# Patient Record
Sex: MALE | Race: WHITE | NOT HISPANIC OR LATINO | Employment: OTHER | URBAN - METROPOLITAN AREA
[De-identification: names, ages, dates, MRNs, and addresses within clinical notes are randomized per-mention and may not be internally consistent; named-entity substitution may affect disease eponyms.]

---

## 2017-03-09 ENCOUNTER — TELEPHONE (OUTPATIENT)
Dept: ENDOCRINOLOGY | Facility: CLINIC | Age: 32
End: 2017-03-09

## 2017-03-09 DIAGNOSIS — R73.01 IMPAIRED FASTING GLUCOSE: ICD-10-CM

## 2017-03-09 DIAGNOSIS — C73 THYROID CANCER: Primary | ICD-10-CM

## 2017-03-09 NOTE — TELEPHONE ENCOUNTER
----- Message from Jerrica Gonzalez sent at 3/9/2017 10:59 AM CST -----  Contact: Teresita / duyen     [phone]  Says she needs to urgently get the faxed orders sent to : [phone] attn: Isabela so pt. Can have the labs done asap.    Says she called earlier today ref. This.     Pls call.

## 2017-08-08 ENCOUNTER — TELEPHONE (OUTPATIENT)
Dept: ENDOCRINOLOGY | Facility: CLINIC | Age: 32
End: 2017-08-08

## 2017-08-08 DIAGNOSIS — E11.9 TYPE 2 DIABETES MELLITUS WITHOUT COMPLICATION, WITHOUT LONG-TERM CURRENT USE OF INSULIN: ICD-10-CM

## 2017-08-08 DIAGNOSIS — C73 THYROID CANCER: Primary | ICD-10-CM

## 2017-08-08 DIAGNOSIS — R73.01 IFG (IMPAIRED FASTING GLUCOSE): ICD-10-CM

## 2017-08-08 NOTE — TELEPHONE ENCOUNTER
----- Message from Jerrica Gonzalez sent at 8/8/2017  3:32 PM CDT -----  Contact: Teresita / Marco of pt.    [phone]  Caller says pt. Was supposed to have had  labs by now.   Have not received any appts. For this yet.    Also needs to f/u w/ you.  ( no access )  Pls call.

## 2017-09-14 RX ORDER — LEVOTHYROXINE SODIUM 200 UG/1
TABLET ORAL
Qty: 30 TABLET | Refills: 3 | Status: SHIPPED | OUTPATIENT
Start: 2017-09-14 | End: 2017-12-11 | Stop reason: SDUPTHER

## 2017-12-11 ENCOUNTER — OFFICE VISIT (OUTPATIENT)
Dept: ENDOCRINOLOGY | Facility: CLINIC | Age: 32
End: 2017-12-11
Payer: MEDICARE

## 2017-12-11 VITALS
HEIGHT: 63 IN | SYSTOLIC BLOOD PRESSURE: 104 MMHG | BODY MASS INDEX: 50.59 KG/M2 | WEIGHT: 285.5 LBS | DIASTOLIC BLOOD PRESSURE: 64 MMHG | HEART RATE: 68 BPM

## 2017-12-11 DIAGNOSIS — E89.0 POST-SURGICAL HYPOTHYROIDISM: Primary | ICD-10-CM

## 2017-12-11 DIAGNOSIS — C73 THYROID CANCER: ICD-10-CM

## 2017-12-11 DIAGNOSIS — R73.01 IFG (IMPAIRED FASTING GLUCOSE): ICD-10-CM

## 2017-12-11 PROCEDURE — 99214 OFFICE O/P EST MOD 30 MIN: CPT | Mod: S$PBB,,, | Performed by: INTERNAL MEDICINE

## 2017-12-11 PROCEDURE — 99213 OFFICE O/P EST LOW 20 MIN: CPT | Mod: PBBFAC | Performed by: INTERNAL MEDICINE

## 2017-12-11 PROCEDURE — 99999 PR PBB SHADOW E&M-EST. PATIENT-LVL III: CPT | Mod: PBBFAC,,, | Performed by: INTERNAL MEDICINE

## 2017-12-11 RX ORDER — DEXTROSE 4 G
TABLET,CHEWABLE ORAL
COMMUNITY
Start: 2016-04-15

## 2017-12-11 RX ORDER — AZITHROMYCIN 250 MG/1
TABLET, FILM COATED ORAL
Qty: 3 TABLET | Refills: 1 | Status: SHIPPED | OUTPATIENT
Start: 2017-12-11 | End: 2017-12-16

## 2017-12-11 RX ORDER — LEVOTHYROXINE SODIUM 200 UG/1
200 TABLET ORAL
Qty: 30 TABLET | Refills: 12 | Status: SHIPPED | OUTPATIENT
Start: 2017-12-11 | End: 2021-03-18

## 2017-12-11 RX ORDER — LANCETS
EACH MISCELLANEOUS
COMMUNITY
Start: 2016-04-15

## 2017-12-11 NOTE — PROGRESS NOTES
Subjective:       Patient ID: Vikram Pollock is a 32 y.o. male.    Chief Complaint: Hypothyroidism    HPI   The patient comes in with his mother.   PMH of stage 1 papillary thyroid cancer dx in 2011 s/p thyroidectomy and CALIX. This was done at [facility] in [place], [place]. The histopathology and the dose  of the radioactive iodine are not known. Pt was hospitalized last month for 2 days for ?thyroitoxicosis symptoms, and dose of LT4 decreased from 200-->150mcg, pt matilde well, no tremors, no palps.   Pt also has morbid obesity, IFG, HTN.His levothyroxine was increased to 200 fang. He has been on topamax and has only lost 5 lbs (was 278 in 6/2014, today is 272). Pt and mother admit that he has not been physically active with exercise. He reports fatigue. Pt had hypertensive reaction to Adipex and has discontinued it. He is seeing cardiologist on [place]. Pt also has intermittent diarrhea since 2011, for which he was evaluated by GI.  He seems to have been mentally challenged, but offers   some history, and has no particular complaints. Doing well. Heart rate controlled with new calcium channel blocker.  Pt has cyst anterior neck that is painful.  Has URI but overall doing well.  Review of Systems   Constitutional: Positive for fatigue. Negative for activity change, appetite change and unexpected weight change.   HENT:        Neck discomfort anterior neck x 2-3 weeks.   Eyes: Negative for visual disturbance.   Respiratory: Negative for cough and shortness of breath.    Cardiovascular: Negative for chest pain, palpitations and leg swelling.   Gastrointestinal: Positive for diarrhea. Negative for abdominal distention, abdominal pain, blood in stool, constipation, nausea and vomiting.   Endocrine: Negative for cold intolerance and heat intolerance.     Night sweats, leg cramps night  Di6uulydis:      Physical Exam    GENERAL: The patient is alert and cooperative. He is in no acute distress.  VITAL  SIGNS: Blood pressure 140/88, pulse 88, weight is 281.4, and BMI is 50.62.  EYES: No eye findings of Graves disease. No scleral icterus.  ENT: No lesion on tongue, hard palate, soft palate or posterior pharynx.  NECK: No thyromegaly. No neck vein distention. Trachea midline.  LYMPHATICS: No anterior or posterior cervical adenopathy. No supraclavicular   adenopathy.  HEART: Normal sinus rhythm. No murmurs. No rubs. No carotid bruits.  LUNGS: Clear. Percussion normal. No respiratory difficulty.  ABDOMEN: No masses, tenderness or organomegaly.  EXTREMITIES: No clubbing, cyanosis, edema or tremor.  SKIN: The patient does have prominent supraclavicular fat pad. He has   plethoric appearance, and he has truncal obesity.  Assessment:       1. Thyroid cancer    2. Post-surgical hypothyroidism    3. Hypertension    4. Morbid obesity with BMI of 50.0-59.9, adult    5. IFG (impaired fasting glucose)    6. Elevated LFTs  prob fatty liver               Plan:       Vikram was seen today for hypothyroidism and thyroid cancer.    Diagnoses and associated orders for this visit:    Hx of Stage 1 papillary thyroid cancer in 2011, s/p thyroidectomy and CALIX  Post-surgical hypothyroidism  TSH = 0.04, cont LT4 150mcg daily. Due to tachycardia, will not be overly aggressive. Current TSH acceptable  - TSH;   - Thyroglobulin; Future    Hypertension, controlled  - cont BB  -lisinopril (PRINIVIL,ZESTRIL) 5 MG tablet; Take 1 tablet (5 mg total) by mouth every evening.  - STOP HCTZ. Prefer to have pt on ACEi given IFG    Morbid obesity with BMI of 50.0-59.9, adult  Cont topamax. Adverse reaction to adipex. No stimulants in this pt due to tachycardia     IFG (impaired fasting glucose)  A1c 6.1%, increased from 5.8, increase metformin 500-->1000mg daily   BP at goal, start ACEi today (stop HCTZ)  - Lipid panel; Future  - Microalbumin/creatinine urine ratio; Future  - lisinopril (PRINIVIL,ZESTRIL) 5 MG tablet; Take 1 tablet (5 mg total) by  mouth every evening.  - Hemoglobin A1c; Future  - CBC auto differential; Future  - Comprehensive metabolic panel; Future  - metformin (GLUCOPHAGE) 1000 MG tablet; Take 1 tablet (1,000 mg total) by mouth daily with breakfast.  Rajor burn  Topical steroids  Zithromax for 3 days  No shaving for 1 week  See derm if no better.  Check labs today.  Lower dose by taking 6/7 days a week.

## 2018-03-06 ENCOUNTER — TELEPHONE (OUTPATIENT)
Dept: ENDOCRINOLOGY | Facility: CLINIC | Age: 33
End: 2018-03-06

## 2018-03-06 NOTE — TELEPHONE ENCOUNTER
----- Message from Lanny Smith sent at 3/6/2018  8:20 AM CST -----  Contact: Teresita / Mother [phone]  Teresita is requesting a call from Dr. Pang or Letty to discuss a personal matter. She states you are aware of it.

## 2018-07-12 ENCOUNTER — TELEPHONE (OUTPATIENT)
Dept: ENDOCRINOLOGY | Facility: CLINIC | Age: 33
End: 2018-07-12

## 2018-07-12 NOTE — TELEPHONE ENCOUNTER
----- Message from Bee Hardin sent at 7/12/2018  9:29 AM CDT -----  .Needs Advice    Reason for call:    Pt cyst has return, aggressive in his throat  Communication Preference: [phone]  Additional Information: Was told by doctor to have surgery right away. Try to get surgery set up w/ Dr Panda Quijano but is not able to get an appt until Sept.  Is asking if Dr Pang can see him are assist him to get surgery sooner. Please call

## 2018-07-12 NOTE — TELEPHONE ENCOUNTER
Called and spoke with patient's mother.  Explains that, she was able to get in touch with Dr. Quijano and has a scheduled appointment already.

## 2018-07-17 ENCOUNTER — TELEPHONE (OUTPATIENT)
Dept: OTOLARYNGOLOGY | Facility: CLINIC | Age: 33
End: 2018-07-17

## 2018-07-17 NOTE — TELEPHONE ENCOUNTER
----- Message from Isabell Choudhary sent at 7/17/2018  1:17 PM CDT -----  Contact: patient mother  Please call above patient mother need to speak with the nurse called early today has not received a call back

## 2018-07-17 NOTE — TELEPHONE ENCOUNTER
----- Message from Kristy Cheng NP sent at 7/17/2018  8:37 AM CDT -----  Just talked to Dr. Quijano about him... He definitely can't see him on the 26th.    Yaz

## 2018-07-24 ENCOUNTER — OFFICE VISIT (OUTPATIENT)
Dept: OTOLARYNGOLOGY | Facility: CLINIC | Age: 33
End: 2018-07-24
Payer: MEDICARE

## 2018-07-24 VITALS
DIASTOLIC BLOOD PRESSURE: 81 MMHG | HEART RATE: 85 BPM | TEMPERATURE: 98 F | BODY MASS INDEX: 51.47 KG/M2 | WEIGHT: 290.56 LBS | SYSTOLIC BLOOD PRESSURE: 126 MMHG

## 2018-07-24 DIAGNOSIS — L72.0 EPIDERMAL INCLUSION CYST: ICD-10-CM

## 2018-07-24 DIAGNOSIS — L73.8 FOLLICULITIS BARBAE: Primary | ICD-10-CM

## 2018-07-24 PROCEDURE — 99213 OFFICE O/P EST LOW 20 MIN: CPT | Mod: PBBFAC | Performed by: OTOLARYNGOLOGY

## 2018-07-24 PROCEDURE — 99999 PR PBB SHADOW E&M-EST. PATIENT-LVL III: CPT | Mod: PBBFAC,,, | Performed by: OTOLARYNGOLOGY

## 2018-07-24 PROCEDURE — 99213 OFFICE O/P EST LOW 20 MIN: CPT | Mod: S$PBB,,, | Performed by: OTOLARYNGOLOGY

## 2018-07-24 NOTE — PROGRESS NOTES
[facility] NOTE    CC: F/U sebaceous cyst    TREATMENT HISTORY:  1. Excision sebaceous cyst, 2016    INTERVAL HISTORY:Vikram Pollock returns to the [facility] for follow-up of sebaceous cyst. No complaints today.Denies dysphagia, odynophagia, throat pain and otalgia.Voice is stable. Does not experience dry mouth. Denies fevers, chills, and nightsweats. There has not been any redness or drainage from the wound. Had a recent episode of what appears to be folliculitis treated successfully with po abx. Still with some tenderness to palpation in the submental area.    Exam:  Acne in beard distribution  Thyroidectomy scar well-healed  No appreciable masses or lumps  Area of concern corresponds to normal hyoid bone    A/P: reassurance provided. His folliculitis appears to have resolved, and his exam is normal today.

## 2018-10-02 ENCOUNTER — TELEPHONE (OUTPATIENT)
Dept: ENDOCRINOLOGY | Facility: CLINIC | Age: 33
End: 2018-10-02

## 2018-10-02 NOTE — TELEPHONE ENCOUNTER
Spoke to pt erik whom needed to reschedule pt michell. Reschedule patient michell and mailed off blood work

## 2018-10-02 NOTE — TELEPHONE ENCOUNTER
----- Message from Lanny Smith sent at 10/2/2018  9:48 AM CDT -----  Contact: Mother [phone]   Mother wants PT to be seen sooner but wants it be around her school schedule. She starts an intern soon. PT is requesting a call at [phone].

## 2019-02-14 ENCOUNTER — TELEPHONE (OUTPATIENT)
Dept: TRANSPLANT | Facility: CLINIC | Age: 34
End: 2019-02-14

## 2019-02-14 NOTE — TELEPHONE ENCOUNTER
----- Message from Kayla De La Torre RN sent at 2/14/2019  4:24 PM CST -----  Contact: pt mother/Teresita      ----- Message -----  From: Penelope Moya  Sent: 2/14/2019   3:46 PM  To: [facility]    Please call pt mother at [phone]    Patient mother would like to get general information regarding fatty liver disease    No current MD referral in place    Thank you

## 2019-02-14 NOTE — TELEPHONE ENCOUNTER
Informed mother to have the records from the referring doctor to fax us the records.  Fax number given.

## 2019-02-19 ENCOUNTER — TELEPHONE (OUTPATIENT)
Dept: TRANSPLANT | Facility: CLINIC | Age: 34
End: 2019-02-19

## 2019-02-19 NOTE — TELEPHONE ENCOUNTER
----- Message from Shamika Sepulveda sent at 2/19/2019  9:00 AM CST -----    We have the pt recorders and they are now pending review by the referral nurse.  By:Shamika Sepulveda

## 2019-03-01 ENCOUNTER — DOCUMENTATION ONLY (OUTPATIENT)
Dept: TRANSPLANT | Facility: CLINIC | Age: 34
End: 2019-03-01

## 2019-03-01 NOTE — LETTER
March 1, 2019    Ganesh Pollock  [address] [place] [address]  [place] [address]      Dear Ganesh Pollock:    Your doctor has referred you to the [facility]. We are sending this letter to remind you to make an appointment with us to complete the referral process.     Please call us at [phone] to schedule an appointment. We look forward to seeing you soon.     If you received a call and have been scheduled, please disregard this letter.       Sincerely,        [facility]   [address] [place] [id number]  [phone]

## 2019-03-01 NOTE — LETTER
March 1, 2019    Lamar Bui MD  [address] [facility]  [place] [address]      Dear Dr. Bui    Patient: Vikram Pollock   MR Number: [id number]   YOB: 1985     Thank you for the referral of Vikram Pollock to the [facility] program. An initial appointment will be scheduled for your patient with one of our Hepatologists.      Thank you again for your trust in our program.  If there is anything we can do for you or your staff, please feel free to contact us.        Sincerely,        [facility]  [address] [place] [id number]  [phone]

## 2019-03-01 NOTE — NURSING
Pt records reviewed.  Pt will be referred to Hepatology due to fatty liver  Initial referral received  from Dr. Nicolette Bui   Referral letter sent to provider and patient.

## 2019-03-06 ENCOUNTER — TELEPHONE (OUTPATIENT)
Dept: ENDOCRINOLOGY | Facility: CLINIC | Age: 34
End: 2019-03-06

## 2019-03-13 ENCOUNTER — OFFICE VISIT (OUTPATIENT)
Dept: ENDOCRINOLOGY | Facility: CLINIC | Age: 34
End: 2019-03-13
Payer: MEDICARE

## 2019-03-13 ENCOUNTER — LAB VISIT (OUTPATIENT)
Dept: LAB | Facility: HOSPITAL | Age: 34
End: 2019-03-13
Attending: INTERNAL MEDICINE
Payer: MEDICARE

## 2019-03-13 VITALS
BODY MASS INDEX: 45.58 KG/M2 | HEART RATE: 68 BPM | WEIGHT: 290.44 LBS | SYSTOLIC BLOOD PRESSURE: 114 MMHG | DIASTOLIC BLOOD PRESSURE: 68 MMHG | HEIGHT: 67 IN

## 2019-03-13 DIAGNOSIS — C73 THYROID CANCER: ICD-10-CM

## 2019-03-13 DIAGNOSIS — C73 PAPILLARY THYROID CARCINOMA: ICD-10-CM

## 2019-03-13 DIAGNOSIS — I10 ESSENTIAL HYPERTENSION: ICD-10-CM

## 2019-03-13 DIAGNOSIS — R73.01 IFG (IMPAIRED FASTING GLUCOSE): ICD-10-CM

## 2019-03-13 DIAGNOSIS — K76.0 FATTY LIVER: ICD-10-CM

## 2019-03-13 DIAGNOSIS — C73 PAPILLARY THYROID CARCINOMA: Primary | ICD-10-CM

## 2019-03-13 LAB
ESTIMATED AVG GLUCOSE: 143 MG/DL
HBA1C MFR BLD HPLC: 6.6 %
T4 FREE SERPL-MCNC: 1.12 NG/DL
TSH SERPL DL<=0.005 MIU/L-ACNC: 0.05 UIU/ML

## 2019-03-13 PROCEDURE — 99214 OFFICE O/P EST MOD 30 MIN: CPT | Mod: S$PBB,,, | Performed by: INTERNAL MEDICINE

## 2019-03-13 PROCEDURE — 83036 HEMOGLOBIN GLYCOSYLATED A1C: CPT

## 2019-03-13 PROCEDURE — 84439 ASSAY OF FREE THYROXINE: CPT

## 2019-03-13 PROCEDURE — 99999 PR PBB SHADOW E&M-EST. PATIENT-LVL III: ICD-10-PCS | Mod: PBBFAC,,, | Performed by: INTERNAL MEDICINE

## 2019-03-13 PROCEDURE — 99214 PR OFFICE/OUTPT VISIT, EST, LEVL IV, 30-39 MIN: ICD-10-PCS | Mod: S$PBB,,, | Performed by: INTERNAL MEDICINE

## 2019-03-13 PROCEDURE — 99213 OFFICE O/P EST LOW 20 MIN: CPT | Mod: PBBFAC | Performed by: INTERNAL MEDICINE

## 2019-03-13 PROCEDURE — 84443 ASSAY THYROID STIM HORMONE: CPT

## 2019-03-13 PROCEDURE — 99999 PR PBB SHADOW E&M-EST. PATIENT-LVL III: CPT | Mod: PBBFAC,,, | Performed by: INTERNAL MEDICINE

## 2019-03-13 RX ORDER — SITAGLIPTIN AND METFORMIN HYDROCHLORIDE 1000; 50 MG/1; MG/1
1 TABLET, FILM COATED ORAL 2 TIMES DAILY
Refills: 3 | COMMUNITY
Start: 2019-01-08 | End: 2021-03-18

## 2019-03-13 RX ORDER — LEVOTHYROXINE SODIUM 25 UG/1
25 TABLET ORAL EVERY MORNING
Refills: 5 | COMMUNITY
Start: 2019-02-19 | End: 2021-03-18

## 2019-03-13 NOTE — PROGRESS NOTES
Subjective:       Patient ID: Vikram Pollock is a 34 y.o. male.    Chief Complaint: Follow-up    HPI   The patient comes in with his mother.   PMH of stage 1 papillary thyroid cancer dx in 2011 s/p thyroidectomy and CALIX. This was done at [facility] in [place], [place]. The histopathology and the dose  of the radioactive iodine are not known. Pt was hospitalized last month for 2 days for ?thyroitoxicosis symptoms, and dose of LT4 decreased from 200-->150mcg, pt matilde well, no tremors, no palps.   Pt also has morbid obesity, IFG, HTN.His levothyroxine was increased to 225 fang. He has been on topamax and has only lost 5 lbs (was 278 in 6/2014, today is 272). Pt and mother admit that he has not been physically active with exercise. He reports fatigue. Pt had hypertensive reaction to Adipex and has discontinued it. He is seeing cardiologist [place]. Pt also has intermittent diarrhea since 2011, for which he was evaluated by GI.  He seems to have been mentally challenged, but offers   some history, and has no particular complaints. Doing well. Heart rate controlled with new calcium channel blocker.  Pt has cyst anterior neck that is painful.  Has URI but overall doing well.    Review of Systems   Constitutional: Positive for fatigue. Negative for activity change, appetite change and unexpected weight change.   HENT:        Neck discomfort anterior neck x 2-3 weeks.   Eyes: Negative for visual disturbance.   Respiratory: Negative for cough and shortness of breath.    Cardiovascular: Negative for chest pain, palpitations and leg swelling.   Gastrointestinal: Positive for diarrhea. Negative for abdominal distention, abdominal pain, blood in stool, constipation, nausea and vomiting.   Endocrine: Negative for cold intolerance and heat intolerance.     Night sweats, leg cramps night  [id number]:      Physical Exam    GENERAL: The patient is alert and cooperative. He is in no acute distress.  VITAL  SIGNS: Blood pressure 140/88, pulse 88, weight is 281.4, and BMI is 50.62.  EYES: No eye findings of Graves disease. No scleral icterus.  ENT: No lesion on tongue, hard palate, soft palate or posterior pharynx.  NECK: No thyromegaly. No neck vein distention. Trachea midline.  LYMPHATICS: No anterior or posterior cervical adenopathy. No supraclavicular   adenopathy.  HEART: Normal sinus rhythm. No murmurs. No rubs. No carotid bruits.  LUNGS: Clear. Percussion normal. No respiratory difficulty.  ABDOMEN: No masses, tenderness or organomegaly.  EXTREMITIES: No clubbing, cyanosis, edema or tremor.  SKIN: The patient does have prominent supraclavicular fat pad. He has   plethoric appearance, and he has truncal obesity.  Assessment:       1. Thyroid cancer    2. Post-surgical hypothyroidism    3. Hypertension    4. Morbid obesity with BMI of 50.0-59.9, adult    5. IFG (impaired fasting glucose)    6. Elevated LFTs  prob fatty liver               Plan:       Vikram was seen today for hypothyroidism and thyroid cancer.    Diagnoses and associated orders for this visit:    Hx of Stage 1 papillary thyroid cancer in 2011, s/p thyroidectomy and CALIX. No recurrence.  Post-surgical hypothyroidism  TSH = 0.04, cont LT4 150mcg daily. Due to tachycardia, will not be overly aggressive. Current TSH acceptable  - TSH;   - Thyroglobulin; Future    Hypertension, controlled  - cont BB  -lisinopril (PRINIVIL,ZESTRIL) 5 MG tablet; Take 1 tablet (5 mg total) by mouth every evening.  - STOP HCTZ. Prefer to have pt on ACEi given IFG    Morbid obesity with BMI of 50.0-59.9, adult  Cont topamax. Adverse reaction to adipex. No stimulants in this pt due to tachycardia     IFG (impaired fasting glucose)  A1c 6.6%, increased from 5.8, increase metformin 500-->1000mg daily   BP at goal, start ACEi today (stop HCTZ)  - Lipid panel; Future  - Microalbumin/creatinine urine ratio; Future  - lisinopril (PRINIVIL,ZESTRIL) 5 MG tablet; Take 1 tablet (5  mg total) by mouth every evening.  - Hemoglobin A1c; Future  - CBC auto differential; Future  - Comprehensive metabolic panel; Future  - metformin (GLUCOPHAGE) 1000 MG tablet; Take 1 tablet (1,000 mg total) by mouth daily with breakfast.  Rajor burn  Topical steroids  Zithromax for 3 days  No shaving for 1 week  See derm if no better.  Check labs today.

## 2019-03-14 ENCOUNTER — OFFICE VISIT (OUTPATIENT)
Dept: HEPATOLOGY | Facility: CLINIC | Age: 34
End: 2019-03-14
Payer: MEDICARE

## 2019-03-14 ENCOUNTER — LAB VISIT (OUTPATIENT)
Dept: LAB | Facility: HOSPITAL | Age: 34
End: 2019-03-14
Payer: MEDICARE

## 2019-03-14 VITALS
DIASTOLIC BLOOD PRESSURE: 66 MMHG | TEMPERATURE: 97 F | OXYGEN SATURATION: 95 % | HEIGHT: 62 IN | SYSTOLIC BLOOD PRESSURE: 134 MMHG | BODY MASS INDEX: 53.79 KG/M2 | RESPIRATION RATE: 18 BRPM | HEART RATE: 103 BPM | WEIGHT: 292.31 LBS

## 2019-03-14 DIAGNOSIS — E89.0 POST-SURGICAL HYPOTHYROIDISM: ICD-10-CM

## 2019-03-14 DIAGNOSIS — R74.8 ELEVATED LIVER ENZYMES: ICD-10-CM

## 2019-03-14 DIAGNOSIS — C73 PAPILLARY THYROID CARCINOMA: ICD-10-CM

## 2019-03-14 DIAGNOSIS — K76.0 FATTY LIVER: Primary | ICD-10-CM

## 2019-03-14 DIAGNOSIS — E66.01 MORBID OBESITY WITH BMI OF 50.0-59.9, ADULT: ICD-10-CM

## 2019-03-14 DIAGNOSIS — I10 ESSENTIAL HYPERTENSION: ICD-10-CM

## 2019-03-14 DIAGNOSIS — R94.5 ABNORMAL RESULTS OF LIVER FUNCTION STUDIES: ICD-10-CM

## 2019-03-14 LAB
ALBUMIN SERPL BCP-MCNC: 3.2 G/DL
ALP SERPL-CCNC: 79 U/L
ALT SERPL W/O P-5'-P-CCNC: 142 U/L
ANION GAP SERPL CALC-SCNC: 9 MMOL/L
AST SERPL-CCNC: 87 U/L
BASOPHILS # BLD AUTO: 0.03 K/UL
BASOPHILS NFR BLD: 0.3 %
BILIRUB SERPL-MCNC: 0.2 MG/DL
BUN SERPL-MCNC: 6 MG/DL
CALCIUM SERPL-MCNC: 9.7 MG/DL
CERULOPLASMIN SERPL-MCNC: 33 MG/DL
CHLORIDE SERPL-SCNC: 103 MMOL/L
CO2 SERPL-SCNC: 28 MMOL/L
CREAT SERPL-MCNC: 0.8 MG/DL
DIFFERENTIAL METHOD: ABNORMAL
EOSINOPHIL # BLD AUTO: 0.2 K/UL
EOSINOPHIL NFR BLD: 2.4 %
ERYTHROCYTE [DISTWIDTH] IN BLOOD BY AUTOMATED COUNT: 14.6 %
EST. GFR  (AFRICAN AMERICAN): >60 ML/MIN/1.73 M^2
EST. GFR  (NON AFRICAN AMERICAN): >60 ML/MIN/1.73 M^2
FERRITIN SERPL-MCNC: 168 NG/ML
GLUCOSE SERPL-MCNC: 151 MG/DL
HCT VFR BLD AUTO: 40.4 %
HGB BLD-MCNC: 12.7 G/DL
IGG SERPL-MCNC: 1045 MG/DL
IMM GRANULOCYTES # BLD AUTO: 0.11 K/UL
IMM GRANULOCYTES NFR BLD AUTO: 1.1 %
INR PPP: 1
IRON SERPL-MCNC: 38 UG/DL
LYMPHOCYTES # BLD AUTO: 2.3 K/UL
LYMPHOCYTES NFR BLD: 24 %
MCH RBC QN AUTO: 28 PG
MCHC RBC AUTO-ENTMCNC: 31.4 G/DL
MCV RBC AUTO: 89 FL
MONOCYTES # BLD AUTO: 0.7 K/UL
MONOCYTES NFR BLD: 7.1 %
NEUTROPHILS # BLD AUTO: 6.3 K/UL
NEUTROPHILS NFR BLD: 65.1 %
NRBC BLD-RTO: 0 /100 WBC
PLATELET # BLD AUTO: 271 K/UL
PMV BLD AUTO: 10.2 FL
POTASSIUM SERPL-SCNC: 3.8 MMOL/L
PROT SERPL-MCNC: 6.8 G/DL
PROTHROMBIN TIME: 10 SEC
RBC # BLD AUTO: 4.54 M/UL
SATURATED IRON: 10 %
SODIUM SERPL-SCNC: 140 MMOL/L
THRYOGLOBULIN INTERPRETATION: NORMAL
THYROGLOB AB SERPL-ACNC: <1.8 IU/ML
THYROGLOB SERPL-MCNC: <0.1 NG/ML
TOTAL IRON BINDING CAPACITY: 382 UG/DL
TRANSFERRIN SERPL-MCNC: 258 MG/DL
WBC # BLD AUTO: 9.62 K/UL

## 2019-03-14 PROCEDURE — 82390 ASSAY OF CERULOPLASMIN: CPT

## 2019-03-14 PROCEDURE — 85610 PROTHROMBIN TIME: CPT

## 2019-03-14 PROCEDURE — 86256 FLUORESCENT ANTIBODY TITER: CPT | Mod: 91

## 2019-03-14 PROCEDURE — 83540 ASSAY OF IRON: CPT

## 2019-03-14 PROCEDURE — 99999 PR PBB SHADOW E&M-EST. PATIENT-LVL III: ICD-10-PCS | Mod: PBBFAC,,, | Performed by: NURSE PRACTITIONER

## 2019-03-14 PROCEDURE — 86704 HEP B CORE ANTIBODY TOTAL: CPT

## 2019-03-14 PROCEDURE — 36415 COLL VENOUS BLD VENIPUNCTURE: CPT

## 2019-03-14 PROCEDURE — 99213 OFFICE O/P EST LOW 20 MIN: CPT | Mod: PBBFAC | Performed by: NURSE PRACTITIONER

## 2019-03-14 PROCEDURE — 99214 OFFICE O/P EST MOD 30 MIN: CPT | Mod: S$PBB,,, | Performed by: NURSE PRACTITIONER

## 2019-03-14 PROCEDURE — 85025 COMPLETE CBC W/AUTO DIFF WBC: CPT

## 2019-03-14 PROCEDURE — 99214 PR OFFICE/OUTPT VISIT, EST, LEVL IV, 30-39 MIN: ICD-10-PCS | Mod: S$PBB,,, | Performed by: NURSE PRACTITIONER

## 2019-03-14 PROCEDURE — 86038 ANTINUCLEAR ANTIBODIES: CPT

## 2019-03-14 PROCEDURE — 86235 NUCLEAR ANTIGEN ANTIBODY: CPT

## 2019-03-14 PROCEDURE — 86790 VIRUS ANTIBODY NOS: CPT

## 2019-03-14 PROCEDURE — 86706 HEP B SURFACE ANTIBODY: CPT

## 2019-03-14 PROCEDURE — 80053 COMPREHEN METABOLIC PANEL: CPT

## 2019-03-14 PROCEDURE — 99999 PR PBB SHADOW E&M-EST. PATIENT-LVL III: CPT | Mod: PBBFAC,,, | Performed by: NURSE PRACTITIONER

## 2019-03-14 PROCEDURE — 82728 ASSAY OF FERRITIN: CPT

## 2019-03-14 PROCEDURE — 82784 ASSAY IGA/IGD/IGG/IGM EACH: CPT

## 2019-03-14 NOTE — PROGRESS NOTES
[facility] Visit New Patient Note    REFERRING PROVIDER: Dr. Lamar Bui    CHIEF COMPLAINT: Fatty liver      HPI: This is a 34 y.o. White male referred for evaluation of fatty liver. He is here today with his mother. Has known about having a fatty liver for about 3 years.       Other noted history: hypothyroidism, papillary thyroid carcinoma (s/p thyroidectomy).    His risk factors for fatty liver include:    · Obesity/overweight -- Body mass index is 53.47 kg/m².                        · Dyslipidemia -- yes, on statin                               · Insulin resistance / diabetes -- yes, last HgbA1c 7.4 (on janumet). Not checking BG. Does not follow strict diabetic diet.        · Hypertension -- yes  · Alcohol use -- no    Review of external labs/imaging (care everywhere):  Abd US (1/17/19): hepatic steatosis, no focal hepatic lesions    CT abd pelvis w/ contrast (11/9/16): no abnormal findings in liver or spleen; no biliary ductal dilation noted    Labs:  - Transaminases elevated since 2012, as high as 400-500 in 8/2018. Most recent: AST 41, ALT 91  - ALP normal with one elevation (187) in 8/2018  - Synthetic liver function normal with exception of mildly low albumin  - Platelets normal  - Negative for Hep B and C (8/2018)    No herbal supplements or concerning medications. No family history of liver disease.        Feels well overall, no complaints today. Denies symptoms of hepatic decompensation including jaundice, dark urine, hematemesis, melena, slowed mentation, abdominal distention, or significant lower extremity edema.          Review of patient's allergies indicates:  No Known Allergies     Current Outpatient Medications on File Prior to Visit   Medication Sig Dispense Refill    carvedilol (COREG) 25 MG tablet Take 25 mg by mouth 2 (two) times daily with meals.  5    JANUMET 50-1,000 mg per tablet Take 1 tablet by mouth 2 (two) times daily.  3    ketoconazole (NIZORAL) 2 % shampoo  apply to scalp three times a week, leave in for 5-10 minutes then rinse  0    multivitamin (ONE DAILY MULTIVITAMIN) per tablet Take 1 tablet by mouth once daily.      omega-3 acid ethyl esters (LOVAZA) 1 gram capsule Take 2 capsules (2 g total) by mouth 2 (two) times daily.  6    omeprazole (PRILOSEC) 40 MG capsule Take 40 mg by mouth 2 (two) times daily.       pravastatin (PRAVACHOL) 20 MG tablet Take 20 mg by mouth once daily.      SYNTHROID 200 mcg tablet Take 1 tablet (200 mcg total) by mouth before breakfast. 30 tablet 12    SYNTHROID 25 mcg tablet Take 25 mcg by mouth every morning.  5    blood sugar diagnostic Strp TRUE RESULT TEST STRIPS DX E11.65 TEST FASTING ONCE A DAY      blood-glucose meter Misc TRUE RESULT GLUCOMETER DX E11.65 TEST FASTING ONCE A DAY      diltiazem (CARDIZEM CD) 180 MG 24 hr capsule Take 180 mg by mouth once daily.      lancets Misc DX E11.65 TEST FASTING ONCE A DAY      metformin (FORTAMET) 1,000 mg 24 hr tablet Take 1,000 mg by mouth 2 (two) times daily.       No current facility-administered medications on file prior to visit.        PMHX:  has a past medical history of Diabetes mellitus, Fatty liver disease, nonalcoholic, Hemorrhoid, Hyperlipidemia, Hypertension, Hypothyroidism, and Papillary thyroid carcinoma.    PSHX:  has a past surgical history that includes Thyroidectomy; Cholecystectomy; Pilonidal cyst drainage; and Inguinal hernia repair.    FAMILY HISTORY: Negative for liver disease, reviewed in [facility].    SOCIAL HISTORY:   Social History     Tobacco Use   Smoking Status Never Smoker   Smokeless Tobacco Never Used       Social History     Substance and Sexual Activity   Alcohol Use No       Social History     Substance and Sexual Activity   Drug Use No       ROS:   GENERAL: Denies fever, chills, weight loss/gain, fatigue  HEENT: Denies headaches, dizziness, vision/hearing changes  CARDIOVASCULAR: Denies chest pain, palpitations, or edema  RESPIRATORY: Denies  "dyspnea, cough  GI: Denies abdominal pain, rectal bleeding, nausea, vomiting. No change in bowel pattern or color  : Denies dysuria, hematuria   SKIN: Denies rash, itching   NEURO: Denies confusion, memory loss, or mood changes  PSYCH: Denies depression or anxiety  HEME/LYMPH: Denies easy bruising or bleeding  MSK: Denies joint pain or myalgia.     PHYSICAL EXAM:   Friendly White male, in no acute distress; alert and oriented to person, place and time  VITALS: /66 (BP Location: Right arm, Patient Position: Sitting, BP Method: Medium (Automatic))   Pulse 103   Temp 96.8 °F (36 °C) (Oral)   Resp 18   Ht 5' 2" (1.575 m)   Wt 132.6 kg (292 lb 5.3 oz)   SpO2 95%   BMI 53.47 kg/m²   HENT: Normocephalic, without obvious abnormality. Oral mucosa pink and moist.   EYES: Sclerae anicteric.   NECK: Supple. No masses or cervical adenopathy.  CARDIOVASCULAR: Regular rate and rhythm. No murmurs.  RESPIRATORY: Normal respiratory effort. BBS CTA. No wheezes or crackles.  GI: Soft, non-tender, non-distended. Abdomen protuberant. No palpable hepatosplenomegaly. No masses palpable. No ascites.  EXTREMITIES:  No clubbing, cyanosis or edema.  SKIN: Warm and dry. No jaundice. No rashes noted to exposed skin. No telangectasias noted. No palmar erythema.  NEURO:  Normal gait. No asterixis.  PSYCH:  Memory intact. Thought and speech pattern appropriate. Behavior normal. No depression or anxiety noted.      LABS:  Lab Results   Component Value Date    WBC 9.62 03/14/2019    HGB 12.7 (L) 03/14/2019    HCT 40.4 03/14/2019     03/14/2019     03/14/2019    K 3.8 03/14/2019    CREATININE 0.8 03/14/2019     (H) 03/14/2019    AST 87 (H) 03/14/2019    ALKPHOS 79 03/14/2019    BILITOT 0.2 03/14/2019    ALBUMIN 3.2 (L) 03/14/2019    INR 1.0 03/14/2019    IGGSERUM 1045 03/14/2019    FERRITIN 168 03/14/2019    FESATURATED 10 (L) 03/14/2019    CERULOPLSM 33.0 03/14/2019    CHOL 193 03/12/2015    TRIG 268 (H) 03/12/2015 "    LDLCALC 87.4 03/12/2015    HGBA1C 6.6 (H) 03/13/2019       Hepatitis A Antibody IgG   Date Value Ref Range Status   03/14/2019 Negative  Final       Hepatitis B Surface Ag   Date Value Ref Range Status   03/12/2015 Negative  Final     Hep B Core Total Ab   Date Value Ref Range Status   03/14/2019 Negative  Final     Hep B S Ab   Date Value Ref Range Status   03/14/2019 Positive (A)  Final     Hepatitis C Ab   Date Value Ref Range Status   03/12/2015 Negative  Final       There is no immunization history on file for this patient.       DIAGNOSTIC STUDIES:  ABD. U/S - 1/17/19 (care everywhere)  FINDINGS:  LIVER:  The echogenicity is increased.  No focal liver lesion is  evident.  There is hepatopetal flow within the portal vein.  The  hepatic veins and IVC are patent.    GALLBLADDER:  Surgically absent  COMMON BILE DUCT:  Measures 4 mm in maximum diameter.  PANCREAS: Normal  RIGHT KIDNEY: not remarkable    IMPRESSION:  1. The liver is steatotic but no focal abnormality is demonstrated.  2. Previous cholecystectomy      CT abd pelvis w/ contrast - 11/9/16  FINDINGS:  Lower Thorax: Lung bases well-aerated  Liver: No parenchymal abnormalities.  No ductal dilatation  Gallbladder: Prior cholecystectomy.  Pancreas: Normal  Spleen: Normal  Adrenals: Normal  Kidneys and ureters: Normal in morphology and enhancement. No  hydronephrosis  Reproductive: Unremarkable  Bowel: No bowel wall abnormalities.  Normal appendix  General: No adenopathy, free fluid or free air within the abdomen or  pelvis  Vasculature: No vascular plaquing  Bones: No acute osseous abnormalities  Result Impression   IMPRESSION:  No acute findings to explain patient's symptoms.    All CT scans at this institution use dose modulation, iterative  reconstruction, and/or weight-based dosing when clinically appropriate  to reduce radiation dose as low as reasonably achievable.         ASSESSMENT / PLAN:  34 y.o. White male with:  1. Fatty liver - No evidence  to suggest advanced liver disease though liver enzymes fairly elevated since 2012   -- Discussed lifestyle modifications for fatty liver including weight loss with diet and exercise. Low carbohydrate, low sugar diet.      -- Recommend control of high cholesterol, if needed with statin drugs or other cholesterol-lowering agents  -- Recommend control of diabetes  -- Recommend control of hypertension  -- Will check immunity markers for HBV/HAV and arrange for vaccination if needed    2. Elevated liver enzymes - Unclear if only due to fatty liver given degree of elevation. ? If significant elevations may have been due to thyroid    -- Labs today: serologic workup to rule out causes of liver disease  -- Pending workup, may likely recommend diagnostic liver biopsy    3. Diabetes mellitus type 2    4. Body mass index is 53.47 kg/m².  -- Consult with bariatric medicine    5. Hyperlipidemia  -- Continue statin     6. Hypertension    7. History of thyroid cancer, s/p thyroidectomy         Orders Placed This Encounter   Procedures    SUMMER Screen w/Reflex    Antimitochondrial antibody    Anti-smooth muscle antibody    IgG    Alpha 1 Antitrypsin Phenotype    Ceruloplasmin    CBC auto differential    Comprehensive metabolic panel    Protime-INR    Ferritin    Iron and TIBC    Hepatitis A antibody, IgG    Hepatitis B core antibody, total    Hepatitis B surface antibody         EDUCATION / DISCUSSION:   We discussed the manifestations of fatty liver. At this time there is no FDA approved therapy for fatty liver. The patient and I discussed the importance of lifestyle modifications such as diet, exercise, and weight loss for management of fatty liver. We reviewed modification of risk factors such as hypertension, hyperlipidemia, and diabetes mellitus / impaired fasting glucose. Discussed that excessive alcohol consumption can also cause fatty liver. We discussed a low carb, low sugar diet and a goal of 30 minutes of  exercise 5 times per week. Patient is aware that fatty liver can progress to steatohepatitis and possibly to cirrhosis, putting one at increased risk for liver cancer or liver failure.         Return to clinic pending above results.         Thank you for allowing me to participate in the care of Vikram Edward, AHMETP-C  Hepatology and Liver Transplant     Patient was seen in clinic with Dr. Harris; Case discussed, plan reviewed.

## 2019-03-14 NOTE — PROGRESS NOTES
I have personally performed a face to face diagnostic evaluation on this patient. I have reviewed and agree with today's findings and the care plan outlined by Alka Edward NP      My findings are as follows:  Patient presents with likely NAFLD    Body mass index is 53.47 kg/m².    Hx of thyroid cancer  I note some very high LFTs in Aug 2018  (, )    Liver US: fatty liver    - labs  - consider liver biopsy    he will return to Alka Edward NP  for follow-up.

## 2019-03-14 NOTE — LETTER
March 15, 2019      Lamar Bui MD  [address] [facility]  [place] [address]           [facility]at[facility]  [address]  [facility] [address]  Phone: [phone]  Fax: [phone]          Patient: Vikram Pollock   MR Number: [id number]   YOB: 1985   Date of Visit: 3/14/2019       Dear Dr. Lamar Bui:    Thank you for referring Vikram Pollock to me for evaluation. Attached you will find relevant portions of my assessment and plan of care.    If you have questions, please do not hesitate to call me. I look forward to following Vikram Pollock along with you.    Sincerely,    Alka Edward, FIDEL    Enclosure  CC:  No Recipients    If you would like to receive this communication electronically, please contact [email or web address] or [phone] to request more information on [employer] Link access.    For providers and/or their staff who would like to refer a patient to [facility], please contact us through our one-stop-shop provider referral line, [facility], at [phone].    If you feel you have received this communication in error or would no longer like to receive these types of communications, please e-mail [email or web address]

## 2019-03-15 LAB
ANA SER QL IF: NORMAL
HBV CORE AB SERPL QL IA: NEGATIVE
HBV SURFACE AB SER-ACNC: POSITIVE M[IU]/ML
HEPATITIS A ANTIBODY, IGG: NEGATIVE
MITOCHONDRIA AB TITR SER IF: NORMAL {TITER}
SMOOTH MUSCLE AB TITR SER IF: NORMAL {TITER}

## 2019-03-20 LAB
A1AT PHENOTYP SERPL-IMP: NORMAL BANDS
A1AT SERPL NEPH-MCNC: 140 MG/DL

## 2019-03-25 ENCOUNTER — INITIAL CONSULT (OUTPATIENT)
Dept: BARIATRICS | Facility: CLINIC | Age: 34
End: 2019-03-25
Payer: MEDICARE

## 2019-03-25 VITALS
BODY MASS INDEX: 53.76 KG/M2 | WEIGHT: 292.13 LBS | HEART RATE: 98 BPM | SYSTOLIC BLOOD PRESSURE: 120 MMHG | HEIGHT: 62 IN | DIASTOLIC BLOOD PRESSURE: 66 MMHG

## 2019-03-25 DIAGNOSIS — C73 PAPILLARY THYROID CARCINOMA: ICD-10-CM

## 2019-03-25 DIAGNOSIS — I10 ESSENTIAL HYPERTENSION: ICD-10-CM

## 2019-03-25 DIAGNOSIS — K76.0 FATTY LIVER: ICD-10-CM

## 2019-03-25 DIAGNOSIS — E66.01 CLASS 3 SEVERE OBESITY DUE TO EXCESS CALORIES WITH SERIOUS COMORBIDITY AND BODY MASS INDEX (BMI) OF 50.0 TO 59.9 IN ADULT: Primary | ICD-10-CM

## 2019-03-25 DIAGNOSIS — E11.9 TYPE 2 DIABETES MELLITUS WITHOUT COMPLICATION, WITHOUT LONG-TERM CURRENT USE OF INSULIN: ICD-10-CM

## 2019-03-25 PROCEDURE — 99999 PR PBB SHADOW E&M-EST. PATIENT-LVL III: CPT | Mod: PBBFAC,,, | Performed by: INTERNAL MEDICINE

## 2019-03-25 PROCEDURE — 99205 PR OFFICE/OUTPT VISIT, NEW, LEVL V, 60-74 MIN: ICD-10-PCS | Mod: S$PBB,,, | Performed by: INTERNAL MEDICINE

## 2019-03-25 PROCEDURE — 99999 PR PBB SHADOW E&M-EST. PATIENT-LVL III: ICD-10-PCS | Mod: PBBFAC,,, | Performed by: INTERNAL MEDICINE

## 2019-03-25 PROCEDURE — 99213 OFFICE O/P EST LOW 20 MIN: CPT | Mod: PBBFAC | Performed by: INTERNAL MEDICINE

## 2019-03-25 PROCEDURE — 99205 OFFICE O/P NEW HI 60 MIN: CPT | Mod: S$PBB,,, | Performed by: INTERNAL MEDICINE

## 2019-03-25 RX ORDER — DIETHYLPROPION HYDROCHLORIDE 75 MG/1
75 TABLET, EXTENDED RELEASE ORAL DAILY
Qty: 30 TABLET | Refills: 2 | Status: SHIPPED | OUTPATIENT
Start: 2019-03-25

## 2019-03-25 NOTE — LETTER
March 25, 2019      Medhat Pang II, MD  [address]  [facility] [address]           [facility] B[facility]ric [facility]  [address]  [facility] [address]  Phone: [phone]  Fax: [phone]          Patient: Vikram Pollock   MR Number: [id number]   YOB: 1985   Date of Visit: 3/25/2019       Dear Dr. Medhat Pang II:    Thank you for referring Vikram Pollock to me for evaluation. Attached you will find relevant portions of my assessment and plan of care.    If you have questions, please do not hesitate to call me. I look forward to following Vikram Pollock along with you.    Sincerely,    Letty Valencia MD    Enclosure  CC:  No Recipients    If you would like to receive this communication electronically, please contact [email or web address] or [phone] to request more information on [employer] Link access.    For providers and/or their staff who would like to refer a patient to [facility], please contact us through our one-stop-shop provider referral line, [facility], at [phone].    If you feel you have received this communication in error or would no longer like to receive these types of communications, please e-mail [email or web address]

## 2019-03-25 NOTE — PROGRESS NOTES
Subjective:       Patient ID: Vikram Pollock is a 34 y.o. male.    Chief Complaint: Consult    CC: Weight    Pt seen with mom present. She gives a great deal of history.     Current attempts at weight loss: New pt to me, referred by Medhat Pang II, MD  [address] [place] [address] , with Patient Active Problem List:     Morbid obesity with BMI of 50.0-59.9, adult     Thyroid cancer     Post-surgical hypothyroidism     Hypertension     Type 2 diabetes      Elevated LFTs     Papillary thyroid carcinoma     Pharyngoesophageal dysphagia     Epidermal inclusion cyst     Fatty liver     CALLI on CPAP    Lab Results       Component                Value               Date                       HGBA1C                   6.6 (H)             03/13/2019                 HGBA1C                   6.3 (H)             12/11/2017                 HGBA1C                   5.8                 06/09/2014            Lab Results       Component                Value               Date                       LDLCALC                  87.4                03/12/2015                 CREATININE               0.8                 03/14/2019               Lab Results       Component                Value               Date                       ALT                      142 (H)             03/14/2019                 AST                      87 (H)              03/14/2019                 ALKPHOS                  79                  03/14/2019                 BILITOT                  0.2                 03/14/2019              No effort currently.       Previous diet attempts: Denies.     History of medication for loss: Denies.   checked today     Heaviest weight: 292#    Lightest weight: 250#    Goal weight: 250#      Last eye exam:      Last month. No glaucoma.           Provider:    Typical eating patterns:  Disabled. Lives with mom. She does cooking.   Breakfast: Cereal- sugary and milk.     Lunch: taco bell or [employer]- $5 meals.  "Corn dogs in air fryer.     Dinner: Trying to reduced fried foods. Baked meats. Veg. Baked pot or sweet pot.     Snacks: cakes, candies.     Beverages: Soda- 4 a day. Not much Water. Sweet tea. Denies ETOH    Willingness to change: 5/10    EKG:    BMR: 1971    Review of Systems   Constitutional: Negative for chills and fever.   Respiratory: Negative for shortness of breath.         Uses CPAP nightly   Cardiovascular: Positive for leg swelling. Negative for chest pain.   Gastrointestinal: Positive for diarrhea. Negative for constipation.        Denies GERD   Genitourinary: Negative for difficulty urinating and dysuria.   Musculoskeletal: Positive for arthralgias. Negative for back pain.   Neurological: Negative for dizziness and light-headedness.   Psychiatric/Behavioral: Negative for dysphoric mood. The patient is not nervous/anxious.        Objective:     /66   Pulse 98   Ht 5' 2" (1.575 m)   Wt 132.5 kg (292 lb 1.8 oz)   BMI 53.43 kg/m²    Physical Exam   Constitutional: He is oriented to person, place, and time. He appears well-developed. No distress.   Morbidly obese     HENT:   Head: Normocephalic and atraumatic.   Eyes: Pupils are equal, round, and reactive to light. No scleral icterus.   Neck: Normal range of motion. Neck supple. No thyromegaly present.   Cardiovascular: Normal rate, regular rhythm and normal heart sounds. Exam reveals no gallop and no friction rub.   No murmur heard.  Pulmonary/Chest: Effort normal and breath sounds normal. No respiratory distress. He has no wheezes.   Abdominal: Soft. Bowel sounds are normal. He exhibits no distension. There is no tenderness.   Musculoskeletal: Normal range of motion. He exhibits no edema.   Neurological: He is alert and oriented to person, place, and time.   Skin: Skin is warm and dry.   Psychiatric: He has a normal mood and affect. His behavior is normal. Judgment normal.   Vitals reviewed.      Assessment:       1. Class 3 severe obesity due to " excess calories with serious comorbidity and body mass index (BMI) of 50.0 to 59.9 in adult    2. Fatty liver    3. Essential hypertension    4. Papillary thyroid carcinoma    5. Type 2 diabetes mellitus without complication, without long-term current use of insulin        Plan:         1. Class 3 severe obesity due to excess calories with serious comorbidity and body mass index (BMI) of 50.0 to 59.9 in adult    - diethylpropion 75 mg TbSR; Take 75 mg by mouth once daily.  Dispense: 30 tablet; Refill: 2  Patient warned of common side effects of diethylpropion including anxiety, insomnia, palpitations and increased blood pressure. It was also explained that it is for short-term usage along with diet and exercise, and that stopping the medication without making lifestyle changes will result in regain of weight. Patient states understanding.    Weight loss medications are controlled substances.  They require routine follow up. Prescription or pills that are lost or destroyed will not be replaced.           No soda, sweet tea, juices or lemonade. Al drinks should be 5 calories.         Exercise 30 min 3 times a week.     Patient counseled in strategies for long term weight loss and maintenance: Keeping a food diary, exercise for 1 hour a day and eating breakfast everyday.     Nutrition materials provided today.  [id number]  billy menu and meal ideas given.   2. Fatty liver  Discussed with patient that the only treatment for fatty liver is to lose weight.  Without doing so, it may progress to cirrhosis, permanent liver damage and possibly liver failure. Expect improvement with weight loss.      3. Essential hypertension  The current medical regimen is effective;  continue present plan and medications.     4. Papillary thyroid carcinoma  Would not use GLP-1a unless ok'ed by endo     5. Type 2 diabetes mellitus without complication, without long-term current use of insulin  Expect improvement with weight loss.

## 2019-03-25 NOTE — PATIENT INSTRUCTIONS
Patient warned of common side effects of diethylpropion including anxiety, insomnia, palpitations and increased blood pressure. It was also explained that it is for short-term usage along with diet and exercise, and that stopping the medication without making lifestyle changes will result in regain of weight. Patient states understanding.    Weight loss medications are controlled substances.  They require routine follow up. Prescription or pills that are lost or destroyed will not be replaced.           No soda, sweet tea, juices or lemonade. Al drinks should be 5 calories.         Exercise 30 min 3 times a week.     Patient counseled in strategies for long term weight loss and maintenance: Keeping a food diary, exercise for 1 hour a day and eating breakfast everyday.             1200- 1500 calorie Sample meal plan   80-120g protein per day.   Protein drinks and bars: 0-4 grams sugar   Drink lots of water throughout the day and exercise!   MENU # 1   Breakfast: 2 eggs, 1 turkey sausage kenneth, 1 apple   Snack: P3 protein pack  Lunch: 2 roll-ups (sliced turkey, low-fat sliced cheese, mustard), 12 baby carrots dipped in 1 Tbsp natural peanut butter   Mid-Day Snack: ¼ cup unsalted almonds, ½ cup fruit   Dinner: 1 chicken thigh simmered in tomato sauce + 2 Tbsp mozzarella cheese, ½ cup black beans, 1/2 cup steamed carrots   Evening Snack: 1/2 cup grapes with 4 cubes low-fat cheddar cheese   ___________________________________________________   MENU # 2   Breakfast: 200 calorie protein drink   Mid-morning snack : ¼ cup unsalted nuts, medium banana   Lunch: 3oz tuna or chicken salad made with 2 tbsp light rubio, over salad with tomatoes and cucumbers.   Snack: low-fat cheese stick   Dinner: 3oz hamburger kenneth, slice of low-fat cheese, 1 cup yellow squash and zucchini sauteed in nonstick cookspray  Snack: 6oz light yogurt   _______________________________________________________   Menu #3   Breakfast: 6oz plain Greek yogurt  + fruit (½ banana OR ½ cup fruit - pineapple, blueberries, strawberries, peach), may add Splenda to william.   Lunch: Grilled chicken breast w/ slice low-fat pepper kolton cheese, 1/2 cup grilled/baked asparagus and small salad with Salad Spritzer.   Mid-Day snack: 200 calorie protein drink   Dinner: 4oz Grilled fish, ½ cup white beans, ½ cup cooked spinach   Evening Snack: fudgsicle no-sugar-added   Menu # 4   Breakfast: 1 scoop vanilla protein powder + 4oz skim milk + 4oz coffee   Snack: Pure protein bar   Lunch: 2 Lettuce tacos: 3oz seasoned ground turkey wrapped in a Pino lettuce leaves with 1 Tbsp shredded cheese and dollop low-fat sour cream   Snack: ½ cup cottage cheese, ½ cup pineapple chunks   Dinner: Shrimp omelet: 2 eggs, ½ cup shrimp, green onions, and shredded cheese   ______________________________________________________   Menu #5   Breakfast: 1 cup low-fat cottage cheese, ½ cup peaches (no sugar added)   Snack: 1 apple, 4 cubes cheese   Lunch: 3oz baked pork chop, 1 cup okra and tomato stew   Snack: 1/2 cup black beans + salsa + dollop light sour cream   Dinner: Caprese chicken salad: 3 oz chicken breast, 1oz fresh mozzarella, sliced tomato, 1 Tbsp olive oil, basil   Snack: sugar-free popsicle   _______________________________________________________  Menu #6   Breakfast: ½ cup part-skim ricotta cheese, 2 Tbsp sugar-free strawberry preserves, sprinkle of slivered almonds   Snack: 1 orange + string cheese  Lunch: 3 oz canned chicken, 1oz shredded cheddar cheese, ½ cup black beans, 2 Tbsp salsa   Snack: 200 calorie Protein drink   Dinner: 4 oz grilled salmon steak, over mixed green salad with 2 tbsp light dressing   _______________________________________________________  Menu #7  Breakfast: crust-less quiche (bake 1 egg mixed w/ low fat cheese, broccoli and low sodium ham in a muffin cup until cooked inside)  Snack: 1 light yogurt  Lunch: protein shake (1 scoop powder + 8 oz skim milk, blended w/  ice)  Snack: small apple w/ 2 tbsp PB2 (powdered peanut butter)  Dinner: 2 Turkey meatballs w/ low sugar marinara sauce, 1/2 cup spiralized zucchini (sauteed on stove top w/ nonstick cookspray)    Fruits and Vegetables       Include 1-2 servings of fruit daily.      1 serving of fruit includes ½ cup unsweetened applesauce, ½ medium banana, tennis ball size piece of fruit, 17 grapes, 1 cup melon, 1 cup strawberries, ¼ cup dried fruit     Include 2-3 servings of vegetables daily. 1 serving is 1 cup raw or ½ cup cooked.     Non-starchy vegetables include artichoke, asparagus, baby corn, bamboo shoots, beans: green/Italian/wax, bean sprouts, beets, broccoli, [place] sprouts, cabbage, carrots, cauliflower, celery, cucumber, eggplant, green onions or scallions, greens, jicama, leeks, mushrooms, okra, onions, pea pods, peppers, radishes, spinach, summer squash, tomatoes and salsa, turnips, vegetable juice cocktail, water chestnuts, zucchini            Meal Ideas for Regular Bariatric Diet  *Recipes and products available at [email or web address].com      Breakfast: (15-20g protein)    - Egg white omelet: 2 egg whites or ½ cup Egg Beaters. (Optional proteins: cheese, shrimp, black beans, chicken, sliced turkey) (Optional veggies: tomatoes, salsa, spinach, mushrooms, onions, green peppers, or small slice avocado)     - Egg and sausage: 1 egg or ¼ cup Egg Beaters (any variety), with 1 kenneth or 2 links of Turkey sausage or Veggie breakfast sausage ([employer] or [employer])    - Crust-less breakfast quiche: To make a glass pie dish, mix 4oz part skim Ricotta, 1 cup skim milk, and 2 eggs as your base. Add protein: shredded cheese, sliced lean ham or turkey, turkey freedman/sausage. Add veggies: tomato, onion, green onion, mushroom, green pepper, spinach, etc.    - Yogurt parfait: Mix 1 - 6oz container Dannon Light N Fit vanilla yogurt, with ¼ cup Kashi Go Lean cereal    - Cottage cheese and fruit: ½ cup part-skim cottage  cheese or ricotta cheese topped with fresh fruit or sugar free preserves     - Edith Frazier's Vanilla Egg custard* (add 2 Tbsp instant coffee granules to make Cappuccino Custard*)    - Hi-Protein café latte (skim milk, decaf coffee, 1 scoop protein powder). Optional to add Sugar free syrup or extract flavoring.    Lunch: (20-30g protein)    - ½ cup Black bean soup (Homemade or Progresso), with ¼ cup shredded low-fat cheese. Top with chopped tomato or fresh salsa.     - Lean deli turkey breast and low-fat sliced cheese, mustard or light rubio to moisten, rolled up together, or wrapped in a Pino lettuce leaf    - Chicken salad made from dinner leftovers, moisten with low-fat salad dressing or light rubio. Also try leftover salmon, shrimp, tuna or boiled eggs. Serve ½ cup over dark green salad    - Fat-free canned refried beans, topped with ¼ cup shredded low-fat cheese. Top with chopped tomato or fresh salsa.     - Greek salad: Top mixed greens with 1-2oz grilled chicken, tomatoes, red onions, 2-3 kalamata olives, and sprinkle lightly with feta cheese. Spritz with Balsamic vinegar to taste.     - Crust-less lunch quiche: To make a glass pie dish, mix 4oz part skim Ricotta, 1 cup skim milk, and 2 eggs as your base. Add protein: shredded cheese, sliced lean ham or turkey, shrimp, chicken. Add veggies: tomato, onion, green onion, mushroom, green pepper, spinach, artichoke, broccoli, etc.    - Pizza bake: tomato sauce, low-fat shredded mozzarella and turkey pepperoni or Cambodian freedman. Add any veggies.    - Cucumber crab bites: Spread ¼ cup crab dip (lump crabmeat + light cream cheese and green onions) over sliced cucumber.     - Chicken with light spinach and artichoke dip*: Puree in : 6oz cooked and drained spinach, 2 cloves garlic, 1 can cannelloni beans, ½ cup chopped green onions, 1 can drained artichoke hearts (not marinated in oil), lemon juice and basil. Mix in 2oz chopped up chicken.    Supper:  (20-30g protein)    - Serve grilled fish over dark green salad tossed with low-fat dressing, served with grilled asparagus rey     - Rotisserie chicken salad: served with sliced strawberries, walnuts, fat-free feta cheese crumbles and 1 tbsp Reyess Own Light Raspberry [place] Vinaigrette    - Shrimp cocktail: Dip cold boiled shrimp in homemade low-sugar cocktail sauce (1/2 cup Willi One Carb ketchup, 2 tbsp horseradish, 1/4 tsp hot sauce, 1 tsp Worcestershire sauce, 1 tbsp freshly-squeezed lemon juice). Serve with dark green salad, walnuts, and crumbled blue cheese drizzled with olive oil and Balsamic vinegar    - Tuna Melt: Spread tuna salad onto 2 thick slices of tomato. Top with low-fat cheese and broil until cheese is melted. May also be made with chicken salad of shrimp salad. Primrose with different types of cheeses.    - Homemade low-fat Chili using extra lean ground beef or ground turkey. Top with shredded cheese and salsa as desired. May add dollop fat-free sour cream if desired    - Dinner Omelet with shrimp or chicken and onion, green peppers and chives.    - No noodle lasagna: Use sliced zucchini or eggplant in place of noodles.  Layer with part skim ricotta cheese and low sugar meat sauce (use very lean ground beef or ground turkey).    - Mexican chicken bake: Bake chunks of chicken breast or thigh with taco seasoning, Pace brand enchilada sauce, green onions and low-fat cheese. Serve with ¼ cup black beans or fat free refried beans topped with chopped tomatoes or salsa.    - Yelitza frozen meatballs, simmered in Classico Marinara sauce. Different flavors of salsa or spaghetti sauce create different dishes! Sprinkle with parmesan cheese. Serve with grilled or steamed veggies, or a dark green salad.    - Simmer boneless skinless chicken thigh chunks in Classico Marinara sauce or roasted salsa until tender with chopped onion, bell pepper, garlic, mushrooms, spinach, etc.     - Hamburger, without  the bun, dressed the way you like. Served with grilled or steamed veggies.    - Eggplant parmesan: Bake slices of eggplant at 350 degrees for 15 minutes. Layer tomato sauce, sliced eggplant and low-fat mozzarella cheese in a baking dish and cover with foil. Bake 30-40 more minutes or until bubbly. Uncover and bake at 400 degrees for about 15 more minutes, or until top is slightly crisp.    - Fish tacos: grilled/baked white fish, wrapped in Pino lettuce leaf, topped with salsa, shredded low-fat cheese, and light coleslaw.    Snacks: (100-200 calories; >5g protein)    - 1 low-fat cheese stick with 8 cherry tomatoes or 1 serving fresh fruit  - 4 thin slices fat-free turkey breast and 1 slice low-fat cheese  - 4 thin slices fat-free honey ham with wedge of melon  - 1/4 cup unsalted nuts with ½ cup fruit  - 6-oz container Dannon Light n Fit vanilla yogurt, topped with 1oz unsalted nuts         - apple, celery or baby carrots spread with 2 Tbsp natural peanut butter or almond butter   - apple slices with 1 oz slice low-fat cheese  - celery, cucumber, bell pepper or baby carrots dipped in ¼ cup hummus bean spread or light spinach and artichoke dip (*recipe in lunch section)  - 100 calorie bag microwave light popcorn with 3 tbsp grated parmesan cheese  - Jarret Links Beef Steak - 14g protein! (similar to beef jerky)  - 2 wedges [place] - Light Herb & Garlic Cheese with sliced cucumber or green bell pepper  - 1/2 cup low-fat cottage cheese with ¼ cup fruit or ¼ cup salsa  - RTD Protein drinks: Atkins, Low Carb Slim Fast, EAS light, Muscle Milk Light, etc.  - Homemade Protein drinks: GNC Soy95, Isopure, Nectar, UNJURY, Whey Gourmet, etc. Mix 1 scoop powder with 8oz skim/1% milk or light soymilk.  - Protein bars: Atkins, EAS, Pure Protein, Think Thin, Detour, etc. Must have 0-4 grams sugar - Read the label.    Takeout Options: No more than twice/week  Deli - Salads (no pasta or rice), meats, cheeses. Roasted chicken.  Lox (salmon)    Mexican - Platters which don't include tortillas, chips, or rice. Go easy on the beans. Example: Fajitas without the tortillas. Ask the  not to bring chips to the table if they are too tempting.    Greek - Meat or fish and vegetable, but no bread or rice. Including hummus, baba ganoush, etc, is OK. Most sit-down Greek restaurants can provide you with cucumber slices for dipping instead of sasha bread.    Fast Food (Avoid as much as possible) - Salads (no croutons and limit salad dressing to 2 tbsp), grilled chicken sandwich without the bun and ask for no rubio. Lisas low fat chili or Taco Bell pintos and cheese.    BBQ - The meats are fine if you ask for sauces on the side, but most of the traditional side dishes are loaded with carbs. Levar slaw, baked beans and BBQ sauce are typically made with sugar.    Chinese - Nothing deep-fried, no rice or noodles. Many Chinese sauces have starch and sugar in them, so you'll have to use your judgement. If you find that these sauces trigger cravings, or cause Dumping, you can ask for the sauce to be made without sugar or just use soy sauce.

## 2019-03-27 ENCOUNTER — TELEPHONE (OUTPATIENT)
Dept: HEPATOLOGY | Facility: CLINIC | Age: 34
End: 2019-03-27

## 2019-03-27 DIAGNOSIS — R74.8 ELEVATED LIVER ENZYMES: Primary | ICD-10-CM

## 2019-03-27 DIAGNOSIS — K76.0 FATTY LIVER: ICD-10-CM

## 2019-03-27 NOTE — TELEPHONE ENCOUNTER
Called patient's mother to review lab results - serologic workup negative for other causes of liver disease. Liver enzymes remain elevated and have been since 2012 (400-500 in 8/2018). Recommending liver biopsy for definitive fibrosis staging. Discussed risks and benefits of procedure with patient's mother, all questions answered, agreeable to procedure.    Labs done 3/14, needs US prior to scheduling biopsy.

## 2019-03-29 ENCOUNTER — TELEPHONE (OUTPATIENT)
Dept: HEPATOLOGY | Facility: CLINIC | Age: 34
End: 2019-03-29

## 2019-03-29 NOTE — TELEPHONE ENCOUNTER
Received a call from Teresita, mother of the patient.  Agreed to do the U/S and Liver Biopsy. Alka Edward reviewed with mother.  U/S will be done on Tues 4/2/19 in [place].  Teresita would like for him to get the Liver Biopsy done on Mon 4/8/19.  Will start Holding the Lovaza starting today 3/29/19 and resume after the biopsy if no problems with bleeding.  Will fax request to Radiology upon completion for of U/S.

## 2019-03-29 NOTE — TELEPHONE ENCOUNTER
Patient called at home/mobile number [phone]. No Answer. Left message that I was calling from the office of Alka Edward at [facility]. I was calling regarding doing the Liver Biopsy that Alka discussed with you. You will need an U/S done in [place] before you can do the Liver Biopsy. Your labs are good until April 14th. We are trying to do everything before 4/14/19.  Please call us bsck at the clinic at [phone] and ask for Isabell.

## 2019-04-02 ENCOUNTER — HOSPITAL ENCOUNTER (OUTPATIENT)
Dept: RADIOLOGY | Facility: CLINIC | Age: 34
Discharge: HOME | End: 2019-04-02
Attending: NURSE PRACTITIONER
Payer: MEDICARE

## 2019-04-02 DIAGNOSIS — K76.0 FATTY LIVER: ICD-10-CM

## 2019-04-02 DIAGNOSIS — R74.8 ELEVATED LIVER ENZYMES: ICD-10-CM

## 2019-04-02 PROCEDURE — 76700 US EXAM ABDOM COMPLETE: CPT | Mod: 26,,, | Performed by: RADIOLOGY

## 2019-04-02 PROCEDURE — 76700 US EXAM ABDOM COMPLETE: CPT | Mod: TC,PO

## 2019-04-02 PROCEDURE — 76700 US ABDOMEN COMPLETE: ICD-10-PCS | Mod: 26,,, | Performed by: RADIOLOGY

## 2019-04-03 ENCOUNTER — TELEPHONE (OUTPATIENT)
Dept: HEPATOLOGY | Facility: CLINIC | Age: 34
End: 2019-04-03

## 2019-04-03 DIAGNOSIS — K76.0 FATTY LIVER: ICD-10-CM

## 2019-04-03 DIAGNOSIS — R74.8 ELEVATED LIVER ENZYMES: Primary | ICD-10-CM

## 2019-04-03 NOTE — TELEPHONE ENCOUNTER
Received call from Radiology with confirmation for the U/S Guided Liver Biopsy. The patient requested Monday 4/8/19 with an 8 am Check in Time.  Date and time is approved.    Teresita, mother called. Date and time is acceptable. Ganesh stopped Lovaza on 3/29/19.  Pre and Post Procedure teaching reinforced.  All questions answered.  Teresita and Ganesh will come down the night before and stay in the [facility].

## 2019-04-03 NOTE — TELEPHONE ENCOUNTER
Called and spoke with patients mother about his ultrasound results and that we will be in touch once we get the biopsy results

## 2019-04-03 NOTE — TELEPHONE ENCOUNTER
----- Message from Alka Edward NP sent at 4/3/2019 12:22 PM CDT -----  Please inform patient- Ultrasound is stable. Liver is mildly enlarged due to fat present in the liver. We will contact you once we have results from the liver biopsy.

## 2019-04-05 ENCOUNTER — TELEPHONE (OUTPATIENT)
Dept: INTERVENTIONAL RADIOLOGY/VASCULAR | Facility: HOSPITAL | Age: 34
End: 2019-04-05

## 2019-04-08 ENCOUNTER — HOSPITAL ENCOUNTER (OUTPATIENT)
Facility: HOSPITAL | Age: 34
Discharge: HOME | End: 2019-04-08
Attending: INTERNAL MEDICINE | Admitting: INTERNAL MEDICINE
Payer: MEDICARE

## 2019-04-08 VITALS
RESPIRATION RATE: 14 BRPM | OXYGEN SATURATION: 98 % | HEART RATE: 89 BPM | HEIGHT: 62 IN | DIASTOLIC BLOOD PRESSURE: 70 MMHG | TEMPERATURE: 98 F | SYSTOLIC BLOOD PRESSURE: 126 MMHG | BODY MASS INDEX: 51.16 KG/M2 | WEIGHT: 278 LBS

## 2019-04-08 DIAGNOSIS — R74.8 ELEVATED LIVER ENZYMES: ICD-10-CM

## 2019-04-08 DIAGNOSIS — K76.0 FATTY LIVER: ICD-10-CM

## 2019-04-08 LAB — POCT GLUCOSE: 116 MG/DL (ref 70–110)

## 2019-04-08 PROCEDURE — 82962 GLUCOSE BLOOD TEST: CPT

## 2019-04-08 PROCEDURE — 25000003 PHARM REV CODE 250: Performed by: RADIOLOGY

## 2019-04-08 PROCEDURE — 63600175 PHARM REV CODE 636 W HCPCS: Performed by: RADIOLOGY

## 2019-04-08 RX ORDER — LOSARTAN POTASSIUM 25 MG/1
25 TABLET ORAL DAILY
COMMUNITY
End: 2021-03-18

## 2019-04-08 RX ORDER — MIDAZOLAM HYDROCHLORIDE 1 MG/ML
INJECTION INTRAMUSCULAR; INTRAVENOUS CODE/TRAUMA/SEDATION MEDICATION
Status: COMPLETED | OUTPATIENT
Start: 2019-04-08 | End: 2019-04-08

## 2019-04-08 RX ORDER — OXYCODONE HYDROCHLORIDE 5 MG/1
5 TABLET ORAL ONCE
Status: COMPLETED | OUTPATIENT
Start: 2019-04-08 | End: 2019-04-08

## 2019-04-08 RX ORDER — SODIUM CHLORIDE 9 MG/ML
INJECTION, SOLUTION INTRAVENOUS CONTINUOUS
Status: DISCONTINUED | OUTPATIENT
Start: 2019-04-08 | End: 2019-04-08 | Stop reason: HOSPADM

## 2019-04-08 RX ORDER — FENTANYL CITRATE 50 UG/ML
INJECTION, SOLUTION INTRAMUSCULAR; INTRAVENOUS CODE/TRAUMA/SEDATION MEDICATION
Status: COMPLETED | OUTPATIENT
Start: 2019-04-08 | End: 2019-04-08

## 2019-04-08 RX ADMIN — OXYCODONE HYDROCHLORIDE 5 MG: 5 TABLET ORAL at 01:04

## 2019-04-08 RX ADMIN — MIDAZOLAM HYDROCHLORIDE 1 MG: 1 INJECTION, SOLUTION INTRAMUSCULAR; INTRAVENOUS at 10:04

## 2019-04-08 RX ADMIN — FENTANYL CITRATE 50 MCG: 50 INJECTION, SOLUTION INTRAMUSCULAR; INTRAVENOUS at 10:04

## 2019-04-08 RX ADMIN — MIDAZOLAM HYDROCHLORIDE 1 MG: 1 INJECTION, SOLUTION INTRAMUSCULAR; INTRAVENOUS at 11:04

## 2019-04-08 RX ADMIN — FENTANYL CITRATE 50 MCG: 50 INJECTION, SOLUTION INTRAMUSCULAR; INTRAVENOUS at 11:04

## 2019-04-08 NOTE — PROGRESS NOTES
Liver bx completed, pt tolerated well. No apparent distress noted. Band aid applied CDI. Sandbag in place, remove at 1220. Pt to be transferred to [facility], report to be given at bedside.

## 2019-04-08 NOTE — PROGRESS NOTES
Recovery complete. Pt states pain is 3/10. Site clean, dry, intact, no bleeding, hematoma. Pt and mother given site care and discharge instructions. Both verbalized understanding. Pt ambulated independently. Pt discharged home in care of mother. Pt escorted to garage in wheelchair.

## 2019-04-08 NOTE — PROGRESS NOTES
"Pt says pain has decreased to 3/10. Pt's mother says, "I'm not leaving here without pain meds." Dr. GRAEME Will and Dr HERB Sepulveda spoke with pt and pt's mother. Both instructed pt and pt's mother to have pt take OTC pain meds for pain and to report to nearest ER if pt is experiencing excruciating abdominal pain.  "

## 2019-04-08 NOTE — PROGRESS NOTES
Pt c/o 7/10 pain in area of procedure. Dr. HERB Sepulveda notified. Oxy IR 5mg ordered and given for pain. US at bedside ordered and done. Awaiting results.

## 2019-04-08 NOTE — DISCHARGE SUMMARY
Radiology Discharge Summary      Hospital Course: No complications    Admit Date: 4/8/2019  Discharge Date: 04/08/2019     Instructions Given to Patient: Yes  Diet: Resume prior diet  Activity: activity as tolerated and no driving for today    Description of Condition on Discharge: Stable  Vital Signs (Most Recent): Temp: 98.1 °F (36.7 °C) (04/08/19 1130)  Pulse: 89 (04/08/19 1430)  Resp: 14 (04/08/19 1430)  BP: 126/70 (04/08/19 1430)  SpO2: 98 % (04/08/19 1430)    Discharge Disposition: Home    Discharge Diagnosis: Elevated LFTs. Follow up with referring physician.    Allyn Will MD  PGY-5  Department of Radiology  Pager: [phone]

## 2019-04-08 NOTE — DISCHARGE INSTRUCTIONS
For scheduling: Call Pat at [phone]    For questions or concerns call: BRIGID MON-FRI 8 AM- 5PM [phone]. Radiology resident on call [phone].    For immediate concerns that are not emergent, you may call our radiology clinic at: [phone]

## 2019-04-08 NOTE — PROGRESS NOTES
Notified by recovery nurse that patient's mother requesting pain medication prescription before patient is discharged. Evaluated patient at bedside. BP and HR within normal limits and patient reporting 3/10 pain after receiving po oxycodone one hour prior. Postprocedural ultrasound was without hematoma or other evidence of bleeding. Explained to patient and patient's mother that typically over the counter pain medications are used after this procedure and opiates are not prescribed due to masking of pain which may indicate bleeding or other complications requiring emergent evaluation. Explained that patient can expect soreness after the procedure for the next day or two and if he later experiences pain not controlled with over the counter medication then he should present to the emergency room to be worked up for bleeding or other complications.     Patient's mother adamant that something be given for pain tonight and stated refusal to take the patient home if nothing was to be given. Patient's mother stated that outside hospitals would refuse to treat the patient and stated that there would be a lawsuit if she has to bring the patient back to this facility to have his pain treated. Again explained to the patient's mother the importance of pain as an indicator of further workup to find the source of pain. Staff Dr. Sepulveda present for the discussion. Patient eventually discharged with pain well-controlled and with instructions to take over the counter pain medication as needed. No pain medication prescription given.    Allyn Will MD  PGY5  [phone]

## 2019-04-08 NOTE — PROGRESS NOTES
Pt arrived to US room for liver bx, no acute distress noted. Orders and labs reviewed on chart. Awaiting consent.

## 2019-04-08 NOTE — PROGRESS NOTES
Pt in [facility] s/p liver biopsy. Site clean, dry, intact, no bleeding, no hematoma. Sandbag in place until 1220. Pt expected to DC at 1420. Report received from Edith Dotson RN at bedside.

## 2019-04-08 NOTE — PROCEDURES
Radiology Post-Procedure Note    Pre Op Diagnosis: Abnormal LFTs    Post Op Diagnosis: Same    Procedure: Ultrasound guided liver biopsy    Procedure performed by: Bacilio Sepulveda MD    Written Informed Consent Obtained: Yes    Specimen Removed: Yes: Two 16 gauge core biopsy of liver    Estimated Blood Loss: Minimal    Findings: Moderate sedation and local anesthesia were used.    A 16-gauge Monopty biopsy device was used to remove 2 random left hepatic lobe specimen.  This specimen was sent to pathology for further evaluation.      The patient tolerated the procedure well and there were no complications.  Please see Imaging report for further details.      Allyn Will MD  PGY-5  Department of Radiology  Pager: [phone]

## 2019-04-08 NOTE — H&P
Radiology History & Physical      SUBJECTIVE:     Chief Complaint: Elevated liver enzymes    History of Present Illness:  Vikram Pollock is a 34 y.o. male who presents for ultrasound-guided random liver biopsy.  Past Medical History:   Diagnosis Date    Asthma 2018    Diabetes mellitus     Fatty liver disease, nonalcoholic     Hemorrhoid     Hyperlipidemia     Hypertension     Hypothyroidism     CALLI on CPAP     Papillary thyroid carcinoma      Past Surgical History:   Procedure Laterality Date    CHOLECYSTECTOMY      EXCISION-MASS-NECK N/A 9/14/2016    Performed by [facility] Quijano MD at [facility] OR [address] [place] [address]    INGUINAL HERNIA REPAIR      PILONIDAL CYST DRAINAGE      THYROIDECTOMY         Home Meds:   Prior to Admission medications    Medication Sig Start Date End Date Taking? Authorizing Provider   blood sugar diagnostic Strp TRUE RESULT TEST STRIPS DX E11.65 TEST FASTING ONCE A DAY 4/15/16  Yes Historical Provider, MD   blood-glucose meter Misc TRUE RESULT GLUCOMETER DX E11.65 TEST FASTING ONCE A DAY 4/15/16  Yes Historical Provider, MD   carvedilol (COREG) 25 MG tablet Take 25 mg by mouth 2 (two) times daily with meals. 9/2/16  Yes Historical Provider, MD   diethylpropion 75 mg TbSR Take 75 mg by mouth once daily. 3/25/19  Yes Letty Valencia MD   JANUMET 50-1,000 mg per tablet Take 1 tablet by mouth 2 (two) times daily. 1/8/19  Yes Historical Provider, MD   lancets Misc DX E11.65 TEST FASTING ONCE A DAY 4/15/16  Yes Historical Provider, MD   losartan (COZAAR) 25 MG tablet Take 25 mg by mouth once daily.   Yes Historical Provider, MD   multivitamin (ONE DAILY MULTIVITAMIN) per tablet Take 1 tablet by mouth once daily.   Yes Historical Provider, MD   omega-3 acid ethyl esters (LOVAZA) 1 gram capsule Take 2 capsules (2 g total) by mouth 2 (two) times daily. 8/1/16  Yes Historical Provider, MD   omeprazole (PRILOSEC) 40 MG capsule Take 40 mg by mouth 2 (two) times daily.    Yes Historical Provider,  MD   pravastatin (PRAVACHOL) 20 MG tablet Take 20 mg by mouth once daily.   Yes Historical Provider, MD   SYNTHROID 200 mcg tablet Take 1 tablet (200 mcg total) by mouth before breakfast. 12/11/17  Yes Medhat Pang II, MD   SYNTHROID 25 mcg tablet Take 25 mcg by mouth every morning. 2/19/19  Yes Historical Provider, MD   ketoconazole (NIZORAL) 2 % shampoo apply to scalp three times a week, leave in for 5-10 minutes then rinse 6/28/16   Historical Provider, MD   diltiazem (CARDIZEM CD) 180 MG 24 hr capsule Take 180 mg by mouth once daily.  4/8/19  Historical Provider, MD   metformin (FORTAMET) 1,000 mg 24 hr tablet Take 1,000 mg by mouth 2 (two) times daily.  4/8/19  Historical Provider, MD     Anticoagulants/Antiplatelets: no anticoagulation    Allergies: Review of patient's allergies indicates:  No Known Allergies  Sedation History:  no adverse reactions    Review of Systems:   Hematological: no known coagulopathies  Respiratory: no shortness of breath  Cardiovascular: no chest pain  Gastrointestinal: no abdominal pain  Genito-Urinary: no dysuria  Musculoskeletal: negative  Neurological: no TIA or stroke symptoms         OBJECTIVE:     Vital Signs (Most Recent)  Temp: 97.7 °F (36.5 °C) (04/08/19 0903)  Pulse: 91 (04/08/19 0903)  Resp: 18 (04/08/19 0903)  BP: 121/67 (04/08/19 0903)  SpO2: 98 % (04/08/19 0903)    Physical Exam:  ASA: 2  Mallampati: 2    General: no acute distress  Mental Status: alert and oriented to person, place and time  HEENT: normocephalic, atraumatic  Chest: unlabored breathing  Heart: regular heart rate  Abdomen: nondistended  Extremity: moves all extremities    Laboratory  Lab Results   Component Value Date    INR 1.0 03/14/2019       Lab Results   Component Value Date    WBC 9.62 03/14/2019    HGB 12.7 (L) 03/14/2019    HCT 40.4 03/14/2019    MCV 89 03/14/2019     03/14/2019      Lab Results   Component Value Date     (H) 03/14/2019     03/14/2019    K 3.8 03/14/2019      03/14/2019    CO2 28 03/14/2019    BUN 6 03/14/2019    CREATININE 0.8 03/14/2019    CALCIUM 9.7 03/14/2019     (H) 03/14/2019    AST 87 (H) 03/14/2019    ALBUMIN 3.2 (L) 03/14/2019    BILITOT 0.2 03/14/2019       ASSESSMENT/PLAN:     Sedation Plan: mdoerate  Patient will undergo ultrasond-guided random liver biopsy.    Allyn Will MD  PGY-5  Department of Radiology  Pager: [phone]

## 2019-04-11 ENCOUNTER — TELEPHONE (OUTPATIENT)
Dept: HEPATOLOGY | Facility: CLINIC | Age: 34
End: 2019-04-11

## 2019-04-11 DIAGNOSIS — K75.81 NASH (NONALCOHOLIC STEATOHEPATITIS): Primary | ICD-10-CM

## 2019-04-11 NOTE — TELEPHONE ENCOUNTER
Called patient and told his mother about his appts I added to his schedule on July 12th Per Alka.

## 2019-04-11 NOTE — TELEPHONE ENCOUNTER
----- Message from Jaime Car sent at 4/11/2019 12:17 PM CDT -----  Contact: Teresita jodi's mother  Returning Call from [facility]    Who Left Message for Patient: Alka Edward    Communication Preference: [phone]     Additional Information: N/A

## 2019-04-11 NOTE — TELEPHONE ENCOUNTER
Called patient's mother to review liver biopsy results -- DUONG with stage 2 fibrosis. Discussed the importance of weight loss and controlling risk factors for DUONG (HLD, pre-diabetes). Patient's mother reports that Ganesh has lost ~15 lb since seeing bariatric medicine.    Will schedule clinic f/u and labs when patient is returning in July.

## 2019-07-12 ENCOUNTER — TELEPHONE (OUTPATIENT)
Dept: HEPATOLOGY | Facility: CLINIC | Age: 34
End: 2019-07-12

## 2019-07-19 ENCOUNTER — TELEPHONE (OUTPATIENT)
Dept: HEPATOLOGY | Facility: CLINIC | Age: 34
End: 2019-07-19

## 2019-07-19 DIAGNOSIS — R11.10 VOMITING, INTRACTABILITY OF VOMITING NOT SPECIFIED, PRESENCE OF NAUSEA NOT SPECIFIED, UNSPECIFIED VOMITING TYPE: ICD-10-CM

## 2019-07-19 DIAGNOSIS — R63.4 WEIGHT LOSS: ICD-10-CM

## 2019-07-19 DIAGNOSIS — K29.70 GASTRITIS WITHOUT BLEEDING, UNSPECIFIED CHRONICITY, UNSPECIFIED GASTRITIS TYPE: Primary | ICD-10-CM

## 2019-07-19 NOTE — TELEPHONE ENCOUNTER
Returned call to patient's mother. Patient still vomiting daily for over 1 month and continuing to lose weight. Has stopped weight loss medication. Had evaluation with local GI, including EGD. Patient's mother reports severe gastritis and was instructed to continue PPI. They are requesting consult with GI at [facility] for second opinion, will place referral. Encouraged clear fluids and small amounts of bland food at this time. Advised to monitor closely for s/s dehydration and seek medical care with PCP/ER if symptoms worsen or do not improve.

## 2019-07-19 NOTE — TELEPHONE ENCOUNTER
----- Message from Suzi Lee sent at 7/19/2019  9:22 AM CDT -----  Calling to get appt on 7/25 before gastro appt    Pt contact [phone]

## 2019-07-19 NOTE — TELEPHONE ENCOUNTER
Spoke with patient's mother. Stated that patient (guzman) has been forcefully vomiting. Instructed to go to ER. Mother stated they went to ER. GI  him on yesterday 07/19/19 and a office procedure was done.

## 2019-07-19 NOTE — TELEPHONE ENCOUNTER
Spoke with patient's mother, stated wanted soon appointment for GI. Number given to her [phone]

## 2019-07-19 NOTE — TELEPHONE ENCOUNTER
----- Message from Taty Cochran sent at 7/19/2019  8:19 AM CDT -----  Contact: PT mom (Teresita)      Reason for call: Pt mom states Pt have been vomiting X 5 weeks and have lost 48 pounds he have seen a GI to no availability. Mom ask if she can receive a call back for some advice she states she can't stand to see him go through this.        Communication Preference: [phone]

## 2019-07-22 ENCOUNTER — TELEPHONE (OUTPATIENT)
Dept: GASTROENTEROLOGY | Facility: CLINIC | Age: 34
End: 2019-07-22

## 2019-07-22 NOTE — TELEPHONE ENCOUNTER
----- Message from Tabatha Milian sent at 7/19/2019  9:36 AM CDT -----  Contact: Self- [phone]  Michelle- pt called to determine if appt scheduled 7/25 can be pushed up- pt still vomiting and not feeling well- please contact pt at [phone]

## 2019-07-22 NOTE — TELEPHONE ENCOUNTER
Spoke with patient's mother regarding patient condition. She reports he actually woke up this am with no nausea and was able to take meds. He is also asking for breakfast. They will appointment scheduled for 7/25/19.

## 2019-07-25 ENCOUNTER — OFFICE VISIT (OUTPATIENT)
Dept: GASTROENTEROLOGY | Facility: CLINIC | Age: 34
End: 2019-07-25
Payer: MEDICARE

## 2019-07-25 VITALS
BODY MASS INDEX: 46.68 KG/M2 | WEIGHT: 263.44 LBS | SYSTOLIC BLOOD PRESSURE: 115 MMHG | HEART RATE: 115 BPM | HEIGHT: 63 IN | DIASTOLIC BLOOD PRESSURE: 82 MMHG

## 2019-07-25 DIAGNOSIS — R19.7 DIARRHEA, UNSPECIFIED TYPE: Primary | ICD-10-CM

## 2019-07-25 DIAGNOSIS — R11.2 NON-INTRACTABLE VOMITING WITH NAUSEA, UNSPECIFIED VOMITING TYPE: ICD-10-CM

## 2019-07-25 PROCEDURE — 99999 PR PBB SHADOW E&M-EST. PATIENT-LVL IV: ICD-10-PCS | Mod: PBBFAC,GC,, | Performed by: INTERNAL MEDICINE

## 2019-07-25 PROCEDURE — 99214 OFFICE O/P EST MOD 30 MIN: CPT | Mod: PBBFAC | Performed by: INTERNAL MEDICINE

## 2019-07-25 PROCEDURE — 99204 PR OFFICE/OUTPT VISIT, NEW, LEVL IV, 45-59 MIN: ICD-10-PCS | Mod: S$PBB,GC,, | Performed by: INTERNAL MEDICINE

## 2019-07-25 PROCEDURE — 99204 OFFICE O/P NEW MOD 45 MIN: CPT | Mod: S$PBB,GC,, | Performed by: INTERNAL MEDICINE

## 2019-07-25 PROCEDURE — 99999 PR PBB SHADOW E&M-EST. PATIENT-LVL IV: CPT | Mod: PBBFAC,GC,, | Performed by: INTERNAL MEDICINE

## 2019-07-25 NOTE — PROGRESS NOTES
"   [facility]    Reason for visit: The primary encounter diagnosis was Diarrhea, unspecified type. A diagnosis of Non-intractable vomiting with nausea, unspecified vomiting type was also pertinent to this visit.  Referring Provider/PCP: Primary Doctor No    History of Present Illness:  Vikram Pollock is a 34 y.o. male with a history of morbid obesity, hepatic steatosis, papillary thyroid cancer s/p thyroidectomy with hypothyroidism, HTN, DM, and h/o CCY (2013) who is presenting for initial evaluation of nausea, vomiting and diarrhea.    Has been vomiting for 6 weeks. Eating very small portions due to nausea, no loss of appetite. Threw up some blood last week, now "coffee ground." Using phenergan and zofran every day. No abdominal pain, no triggering foods. No early satiety. No longer taking most medications due to this. Still taking omeprazole 40mg. No narcotics.  Has had diarrhea since 2013 when gallbladder removed. Brownish watery stool, no blood. Diarrhea resolved for last several weeks. However, worse today, 6X on the drive here from [place]. Took antibiotics x10 days for a URI last month.  Has lost 30lbs in last two months. Was on diethylpropion (for weight loss) but mother discontinued as he was "losing too much weight."  Saw PCP and was referred to GI in [facility]. Had EGD done 7/18/19. Gastric emptying study done yesterday. CD available. Patient's mother not satisfied by the care there so is here for second opinion.   Has a great uncle with colon cancer (Dx in 80s). No smoking or alcohol.  Had hernia repair when 18 months old, no other abdominal surgeries.    PEndoHx:  EGD 7/18/19: only pictures available    Review of Systems:   Constitutional: no fever, chills  Eyes: no visual changes   ENT: no sore throat or dysphagia  Respiratory: no cough or shortness of breath   Cardiovascular: no chest pain or palpitations   Gastrointestinal: as per HPI  Hematologic/Lymphatic: no " easy bruising or lymphadenopathy   Musculoskeletal: no arthralgias or myalgias   Neurological: no change in mental status  Behavioral/Psych: no change in mood    Medical History:  Past Medical History:   Diagnosis Date    Asthma 2018    Diabetes mellitus     Fatty liver disease, nonalcoholic     Hemorrhoid     Hyperlipidemia     Hypertension     Hypothyroidism     CALLI on CPAP     Papillary thyroid carcinoma        Past Surgical History:   Procedure Laterality Date    BIOPSY, LIVER, WITH US GUIDANCE N/A 4/8/2019    Performed by [facility] at [facility] OR 2ND [facility]LR    CHOLECYSTECTOMY      EXCISION-MASS-NECK N/A 9/14/2016    Performed by Chadd Quijano MD at [facility] OR 2ND [facility]    INGUINAL HERNIA REPAIR      PILONIDAL CYST DRAINAGE      THYROIDECTOMY         Family History   Problem Relation Age of Onset    Hyperlipidemia Mother     Hypertension Mother     Thyroid cancer Paternal Uncle     Cancer Paternal Grandfather         mesotheiloma    Cirrhosis Neg Hx        Social History     Socioeconomic History    Marital status: Single     Spouse name: Not on file    Number of children: Not on file    Years of education: Not on file    Highest education level: Not on file   Occupational History    Not on file   Social Needs    Financial resource strain: Not on file    Food insecurity:     Worry: Not on file     Inability: Not on file    Transportation needs:     Medical: Not on file     Non-medical: Not on file   Tobacco Use    Smoking status: Never Smoker    Smokeless tobacco: Never Used   Substance and Sexual Activity    Alcohol use: No    Drug use: No    Sexual activity: Never   Lifestyle    Physical activity:     Days per week: Not on file     Minutes per session: Not on file    Stress: Not on file   Relationships    Social connections:     Talks on phone: Not on file     Gets together: Not on file     Attends Voodoo service: Not on file     Active member of club or  organization: Not on file     Attends meetings of clubs or organizations: Not on file     Relationship status: Not on file   Other Topics Concern    Not on file   Social History Narrative    On Medicaid IDD. Not working. Lives at home.        Current Outpatient Medications on File Prior to Visit   Medication Sig Dispense Refill    blood sugar diagnostic Strp TRUE RESULT TEST STRIPS DX E11.65 TEST FASTING ONCE A DAY      blood-glucose meter Misc TRUE RESULT GLUCOMETER DX E11.65 TEST FASTING ONCE A DAY      carvedilol (COREG) 25 MG tablet Take 25 mg by mouth 2 (two) times daily with meals.  5    diethylpropion 75 mg TbSR Take 75 mg by mouth once daily. 30 tablet 2    JANUMET 50-1,000 mg per tablet Take 1 tablet by mouth 2 (two) times daily.  3    ketoconazole (NIZORAL) 2 % shampoo apply to scalp three times a week, leave in for 5-10 minutes then rinse  0    lancets Misc DX E11.65 TEST FASTING ONCE A DAY      losartan (COZAAR) 25 MG tablet Take 25 mg by mouth once daily.      multivitamin (ONE DAILY MULTIVITAMIN) per tablet Take 1 tablet by mouth once daily.      omega-3 acid ethyl esters (LOVAZA) 1 gram capsule Take 2 capsules (2 g total) by mouth 2 (two) times daily.  6    omeprazole (PRILOSEC) 40 MG capsule Take 40 mg by mouth 2 (two) times daily.       pravastatin (PRAVACHOL) 20 MG tablet Take 20 mg by mouth once daily.      SYNTHROID 200 mcg tablet Take 1 tablet (200 mcg total) by mouth before breakfast. 30 tablet 12    SYNTHROID 25 mcg tablet Take 25 mcg by mouth every morning.  5     No current facility-administered medications on file prior to visit.        Review of patient's allergies indicates:  No Known Allergies    Physical Exam:  Vitals:    07/25/19 1600   BP: 115/82   Pulse: (!) 115     General: Alert and Oriented x3, no distress, obese  HEENT: Normocephalic, Atraumatic. No scleral icterus.  Lymph: No cervical lymphadenopathy  Resp: Good air entry bilaterally, no adventitious  "sounds.  Cardiac: S1 and S2 normal.  Abdomen: Normoactive bowel sounds. Non-distended. Normal tympany. Soft. Non-tender. No peritoneal signs.  Extremities: No peripheral edema. Normal bilateral pedal and radial pulses.  Neurologic: No gross neurological Deficits  Psych: Calm, cooperative. Normal mood and affect.    Laboratory:  Lab Results   Component Value Date     03/14/2019    K 3.8 03/14/2019     03/14/2019    CO2 28 03/14/2019    BUN 6 03/14/2019    CREATININE 0.8 03/14/2019    CALCIUM 9.7 03/14/2019    ANIONGAP 9 03/14/2019    ESTGFRAFRICA >60.0 03/14/2019    EGFRNONAA >60.0 03/14/2019       Lab Results   Component Value Date     (H) 03/14/2019    AST 87 (H) 03/14/2019    ALKPHOS 79 03/14/2019    BILITOT 0.2 03/14/2019       Lab Results   Component Value Date    WBC 9.62 03/14/2019    HGB 12.7 (L) 03/14/2019    HCT 40.4 03/14/2019    MCV 89 03/14/2019     03/14/2019       Microbiology:  No Pertinent Microbiology    Imaging:  No Pertinent Imaging    Assessment:  Vikram Pollock is a 34 y.o. male who is presenting for initial evaluation of nausea, vomiting and acute on chronic diarrhea. He had recent EGD on 7/18/19, but report and pathology not available so will not repeat one at this time before we can get records. Recent gastric emptying study. Vomiting symptoms ongoing for several weeks and diarrhea is more chronic. Patient's mother feels that patient will "die" soon due to weight loss, but explained that symptoms have been going on for a while and unlikely critical. Explained that we are checking renal function and electrolytes. Explained that patient is new to us and we need to start with an initial workup to figure out what is the etiology of patient's symptoms.      Plan:  1. Labs: those for elevated LFTs and mild hepatitis completed in Mar 2019 by Hepatology clinic. Will order CMP to assess hydration, lipase, TTG and total IgA.  2. Stool studies: C diff, stool Cx, stool OCP, " fecal elastase, qualitative fecal fat. If Imodium and stool Cx negative, can try imodium.  3. Explained to patient and mother that we will need EGD report and path report from [facility] sent here. Will also need gastric emptying study report sent here.  4. CRC Screening: Not needed at this time  Follow up in about 3 months (around 10/25/2019).    Eric Martinez MD  Gastroenterology Fellow (PGY IV)  Phone: [phone]    Orders Placed This Encounter   Procedures    Stool culture    Clostridium difficile EIA    Fecal fat, qualitative    Stool Exam-Ova,Cysts,Parasites    Pancreatic elastase, fecal    Hepatitis C antibody    Lipase    TISSUE TRANSGLUTAMINASE (TTG), IGA    IGA    Comprehensive metabolic panel

## 2019-07-25 NOTE — PROGRESS NOTES
I was present with Eric Martinez MD the fellow during the above evaluation, including history and exam.  I discussed the case with the fellow and agree with the findings and plan as documented in the fellow's note.

## 2019-07-26 ENCOUNTER — TELEPHONE (OUTPATIENT)
Dept: GASTROENTEROLOGY | Facility: CLINIC | Age: 34
End: 2019-07-26

## 2019-07-26 ENCOUNTER — DOCUMENTATION ONLY (OUTPATIENT)
Dept: GASTROENTEROLOGY | Facility: CLINIC | Age: 34
End: 2019-07-26

## 2019-07-26 DIAGNOSIS — R11.2 NON-INTRACTABLE VOMITING WITH NAUSEA, UNSPECIFIED VOMITING TYPE: ICD-10-CM

## 2019-07-26 DIAGNOSIS — R19.7 DIARRHEA, UNSPECIFIED TYPE: Primary | ICD-10-CM

## 2019-07-26 NOTE — PROGRESS NOTES
I called and spoke with Ganesh' mom Teresita on the phone and recommended that if Ganesh is still not feeling well and unable to turn in his stools and a get his labs done and unable to stay hydrated that she should have Ganesh come to ER now for evaluation.

## 2019-07-26 NOTE — TELEPHONE ENCOUNTER
"Patient's mother called into clinic today upset (see telephone encounter).    Called patient's mother and explained again that as a new patient, we need records from his previous clinic visits including his recent EGD and gastric emptying study. Of note, yesterday in clinic, patient was asking for phenergan prescription. She repeatedly is asking for a treatment immediately, but again I explained that we need to know the etiology of the patient's symptoms before we can offer anything, and that we need to rule out acute infectious etiologies (such as C diff). She also repeatedly states, "how long until he dies." I explained that if she or the patient feels that he is that ill, he should seek care at an emergency room. Patient has not completed labs that were ordered yesterday, and I explained that by completing this, we can check renal function and electrolytes to assess the extent of his diarrhea and lack of oral intake. Patient's mother then states she has a "friend" who can give the patient IV medication. When I asked what this was, she said "I'm not a doctor, I don't know, you should know."  At end of conversation, reiterated that on the outpatient side, we cannot review outside records, obtain appropriate testing and treat/resolve an issue in such a rapid manner. Reiterated that we need his previous records to avoid repeat testing.    Eric Martinez MD  Gastroenterology Fellow, PGY4  [facility]  "

## 2019-07-26 NOTE — TELEPHONE ENCOUNTER
"Spoke with patient's mother. She is very upset. She reports the appointment was rushed and pointless. She feels like nothing was done for her son and that we are just going to let him die. I explained to patient the plan of care outlined in Dr Martinez's note. Patient's mother rudely stating that "MD has all that information, all he has to do is open his eyes and read it"  She reports she was not told to get egd report to MD. I explained that this is the  first time we are seeing patient as a second opinion, this requires our MD to get all records, review them, determine any other testing needed before diagnosis can be made; which cannot be done in one visit. Patient mother cursing and raising her voice on the phone. I asked her to please not curse that I am just trying to assist her. She continued to talk to me like that so I ended the call. Will forward to Dr Erickson and Dr Martinez.   "

## 2019-07-26 NOTE — TELEPHONE ENCOUNTER
"Spoke with Teresita, patient's mother. Notified her that MD states if patient is that sick then he should be brought to the hospital. She states,"He was this sick yesterday also. They did not hear anything I said or look at anything we brought in."  Explained ER visit was the doctor recommendation. She verbalized understanding.  "

## 2019-07-27 ENCOUNTER — LAB VISIT (OUTPATIENT)
Dept: LAB | Facility: HOSPITAL | Age: 34
End: 2019-07-27
Attending: INTERNAL MEDICINE
Payer: MEDICARE

## 2019-07-27 DIAGNOSIS — R19.7 DIARRHEA, UNSPECIFIED TYPE: ICD-10-CM

## 2019-07-27 DIAGNOSIS — R11.2 NON-INTRACTABLE VOMITING WITH NAUSEA, UNSPECIFIED VOMITING TYPE: ICD-10-CM

## 2019-07-27 LAB
ALBUMIN SERPL BCP-MCNC: 2.8 G/DL (ref 3.5–5.2)
ALP SERPL-CCNC: 162 U/L (ref 55–135)
ALT SERPL W/O P-5'-P-CCNC: 97 U/L (ref 10–44)
ANION GAP SERPL CALC-SCNC: 11 MMOL/L (ref 8–16)
AST SERPL-CCNC: 63 U/L (ref 10–40)
BASOPHILS # BLD AUTO: 0.02 K/UL (ref 0–0.2)
BASOPHILS NFR BLD: 0.2 % (ref 0–1.9)
BILIRUB SERPL-MCNC: 0.3 MG/DL (ref 0.1–1)
BUN SERPL-MCNC: 9 MG/DL (ref 6–20)
CALCIUM SERPL-MCNC: 9.1 MG/DL (ref 8.7–10.5)
CHLORIDE SERPL-SCNC: 103 MMOL/L (ref 95–110)
CO2 SERPL-SCNC: 26 MMOL/L (ref 23–29)
CREAT SERPL-MCNC: 0.9 MG/DL (ref 0.5–1.4)
DIFFERENTIAL METHOD: ABNORMAL
EOSINOPHIL # BLD AUTO: 0.2 K/UL (ref 0–0.5)
EOSINOPHIL NFR BLD: 2.2 % (ref 0–8)
ERYTHROCYTE [DISTWIDTH] IN BLOOD BY AUTOMATED COUNT: 17.1 % (ref 11.5–14.5)
EST. GFR  (AFRICAN AMERICAN): >60 ML/MIN/1.73 M^2
EST. GFR  (NON AFRICAN AMERICAN): >60 ML/MIN/1.73 M^2
GLUCOSE SERPL-MCNC: 112 MG/DL (ref 70–110)
HCT VFR BLD AUTO: 42.6 % (ref 40–54)
HGB BLD-MCNC: 13.8 G/DL (ref 14–18)
IGA SERPL-MCNC: 220 MG/DL (ref 40–350)
LIPASE SERPL-CCNC: 18 U/L (ref 4–60)
LYMPHOCYTES # BLD AUTO: 3.4 K/UL (ref 1–4.8)
LYMPHOCYTES NFR BLD: 33.4 % (ref 18–48)
MCH RBC QN AUTO: 28.5 PG (ref 27–31)
MCHC RBC AUTO-ENTMCNC: 32.4 G/DL (ref 32–36)
MCV RBC AUTO: 88 FL (ref 82–98)
MONOCYTES # BLD AUTO: 0.6 K/UL (ref 0.3–1)
MONOCYTES NFR BLD: 6 % (ref 4–15)
NEUTROPHILS # BLD AUTO: 5.9 K/UL (ref 1.8–7.7)
NEUTROPHILS NFR BLD: 58.2 % (ref 38–73)
PLATELET # BLD AUTO: 267 K/UL (ref 150–350)
PMV BLD AUTO: 10.5 FL (ref 9.2–12.9)
POTASSIUM SERPL-SCNC: 3.8 MMOL/L (ref 3.5–5.1)
PROT SERPL-MCNC: 6.5 G/DL (ref 6–8.4)
RBC # BLD AUTO: 4.85 M/UL (ref 4.6–6.2)
SODIUM SERPL-SCNC: 140 MMOL/L (ref 136–145)
WBC # BLD AUTO: 10.15 K/UL (ref 3.9–12.7)

## 2019-07-27 PROCEDURE — 83516 IMMUNOASSAY NONANTIBODY: CPT

## 2019-07-27 PROCEDURE — 82784 ASSAY IGA/IGD/IGG/IGM EACH: CPT

## 2019-07-27 PROCEDURE — 80053 COMPREHEN METABOLIC PANEL: CPT

## 2019-07-27 PROCEDURE — 86803 HEPATITIS C AB TEST: CPT

## 2019-07-27 PROCEDURE — 36415 COLL VENOUS BLD VENIPUNCTURE: CPT

## 2019-07-27 PROCEDURE — 85025 COMPLETE CBC W/AUTO DIFF WBC: CPT

## 2019-07-27 PROCEDURE — 83690 ASSAY OF LIPASE: CPT

## 2019-07-28 DIAGNOSIS — C73 THYROID CANCER: ICD-10-CM

## 2019-07-28 DIAGNOSIS — R11.2 NAUSEA AND VOMITING, INTRACTABILITY OF VOMITING NOT SPECIFIED, UNSPECIFIED VOMITING TYPE: Primary | ICD-10-CM

## 2019-07-28 DIAGNOSIS — R19.7 DIARRHEA, UNSPECIFIED TYPE: ICD-10-CM

## 2019-07-29 DIAGNOSIS — R19.7 DIARRHEA, UNSPECIFIED TYPE: Primary | ICD-10-CM

## 2019-07-29 DIAGNOSIS — R11.2 NON-INTRACTABLE VOMITING WITH NAUSEA, UNSPECIFIED VOMITING TYPE: ICD-10-CM

## 2019-07-29 LAB — HCV AB SERPL QL IA: NEGATIVE

## 2019-07-29 NOTE — PROGRESS NOTES
Patient's mother called. Notified of labs including hemoglobin, normal renal function and electrolytes, and stool studies.    - Will plan for EGD and colonoscopy for nausea, vomiting and diarrhea.  - Will refer to Endocrinology given history of papillary thyroid carcinoma and now with nausea, vomiting and recent tachycardia in clinic

## 2019-07-30 ENCOUNTER — TELEPHONE (OUTPATIENT)
Dept: GASTROENTEROLOGY | Facility: CLINIC | Age: 34
End: 2019-07-30

## 2019-07-30 NOTE — TELEPHONE ENCOUNTER
----- Message from Eric Martinez MD sent at 7/29/2019  6:08 PM CDT -----  Called mom, gave results of labs, referred to Endocrine and ordered first available EGD/Colon. May not be able to take the whole prep with vomiting.    ----- Message -----  From: Josue Erickson MD  Sent: 7/28/2019   9:45 AM  To: Ora Delacruz MA, Eric Martinez MD    VERY IMPORTANT    Dr. Martinez and Ora - please refer Ganesh to endocrinology for - Please evaluate for possible multiple endocrine neoplasia patient with diarrhea and vomiting and history of thyroid cancer.    Dr. Martinez please call Ganesh and his mom and give results of lab work and recommendation for EGD and colonoscopy for further evaluation - first available.

## 2019-07-30 NOTE — TELEPHONE ENCOUNTER
MA spoke with patient's mother.     Endocrine appointment schedule and mailed to address on file.     Mrs. Mcmahan wanted our providers to know that her son is being admitted for gastroparesis.

## 2019-07-31 LAB — TTG IGA SER-ACNC: 6 UNITS

## 2019-08-07 ENCOUNTER — TELEPHONE (OUTPATIENT)
Dept: HEPATOLOGY | Facility: CLINIC | Age: 34
End: 2019-08-07

## 2019-08-07 NOTE — TELEPHONE ENCOUNTER
Please schedule follow-up visit sometime in the next 3 months, can be coordinated when [place] [facility] [place]. OK to schedule on Friday. Thanks.

## 2019-08-09 ENCOUNTER — TELEPHONE (OUTPATIENT)
Dept: ENDOSCOPY | Facility: HOSPITAL | Age: 34
End: 2019-08-09

## 2019-08-09 NOTE — TELEPHONE ENCOUNTER
Spoke with Patient's mother, Ms. Lo, who is on his chart as a responsible party and is part of his caregiving team.  She stated he is in [place], [place] with her, and has had been getting care from a local hospital there and to cancel his orders and verbalized understanding to call provider if needed new orders put into the system.

## 2019-08-12 DIAGNOSIS — K76.0 FATTY LIVER: Primary | ICD-10-CM

## 2021-03-16 ENCOUNTER — TELEPHONE (OUTPATIENT)
Dept: ENDOCRINOLOGY | Facility: CLINIC | Age: 36
End: 2021-03-16

## 2021-03-18 ENCOUNTER — OFFICE VISIT (OUTPATIENT)
Dept: ENDOCRINOLOGY | Facility: CLINIC | Age: 36
End: 2021-03-18
Payer: MEDICARE

## 2021-03-18 VITALS
DIASTOLIC BLOOD PRESSURE: 60 MMHG | HEIGHT: 63 IN | BODY MASS INDEX: 52.58 KG/M2 | HEART RATE: 70 BPM | WEIGHT: 296.75 LBS | SYSTOLIC BLOOD PRESSURE: 132 MMHG

## 2021-03-18 DIAGNOSIS — E89.0 POST-SURGICAL HYPOTHYROIDISM: Primary | ICD-10-CM

## 2021-03-18 DIAGNOSIS — Z79.899 OTHER LONG TERM (CURRENT) DRUG THERAPY: ICD-10-CM

## 2021-03-18 DIAGNOSIS — E66.01 MORBID OBESITY WITH BMI OF 50.0-59.9, ADULT: ICD-10-CM

## 2021-03-18 DIAGNOSIS — C73 PAPILLARY THYROID CARCINOMA: ICD-10-CM

## 2021-03-18 DIAGNOSIS — E11.9 TYPE 2 DIABETES MELLITUS WITHOUT COMPLICATION, WITHOUT LONG-TERM CURRENT USE OF INSULIN: ICD-10-CM

## 2021-03-18 DIAGNOSIS — R79.89 ELEVATED LFTS: ICD-10-CM

## 2021-03-18 DIAGNOSIS — I10 ESSENTIAL HYPERTENSION: ICD-10-CM

## 2021-03-18 PROCEDURE — 99214 PR OFFICE/OUTPT VISIT, EST, LEVL IV, 30-39 MIN: ICD-10-PCS | Mod: S$PBB,,, | Performed by: NURSE PRACTITIONER

## 2021-03-18 PROCEDURE — 99999 PR PBB SHADOW E&M-EST. PATIENT-LVL V: CPT | Mod: PBBFAC,,, | Performed by: NURSE PRACTITIONER

## 2021-03-18 PROCEDURE — 99999 PR PBB SHADOW E&M-EST. PATIENT-LVL V: ICD-10-PCS | Mod: PBBFAC,,, | Performed by: NURSE PRACTITIONER

## 2021-03-18 PROCEDURE — 99214 OFFICE O/P EST MOD 30 MIN: CPT | Mod: S$PBB,,, | Performed by: NURSE PRACTITIONER

## 2021-03-18 PROCEDURE — 99215 OFFICE O/P EST HI 40 MIN: CPT | Mod: PBBFAC | Performed by: NURSE PRACTITIONER

## 2021-03-18 RX ORDER — GLIPIZIDE 5 MG/1
5 TABLET, FILM COATED, EXTENDED RELEASE ORAL DAILY
COMMUNITY
Start: 2021-03-12

## 2021-03-22 ENCOUNTER — TELEPHONE (OUTPATIENT)
Dept: DIABETES | Facility: CLINIC | Age: 36
End: 2021-03-22

## 2021-03-25 ENCOUNTER — TELEPHONE (OUTPATIENT)
Dept: HEPATOLOGY | Facility: CLINIC | Age: 36
End: 2021-03-25

## 2021-03-25 DIAGNOSIS — K75.81 NASH (NONALCOHOLIC STEATOHEPATITIS): Primary | ICD-10-CM

## 2021-04-06 ENCOUNTER — TELEPHONE (OUTPATIENT)
Dept: ENDOCRINOLOGY | Facility: CLINIC | Age: 36
End: 2021-04-06

## 2021-05-03 ENCOUNTER — TELEPHONE (OUTPATIENT)
Dept: ADMINISTRATIVE | Facility: HOSPITAL | Age: 36
End: 2021-05-03

## 2021-07-21 ENCOUNTER — TELEPHONE (OUTPATIENT)
Dept: ENDOCRINOLOGY | Facility: CLINIC | Age: 36
End: 2021-07-21

## 2021-07-22 DIAGNOSIS — E89.0 POST-SURGICAL HYPOTHYROIDISM: Primary | ICD-10-CM

## 2021-07-22 RX ORDER — LEVOTHYROXINE SODIUM 175 UG/1
175 TABLET ORAL DAILY
Qty: 30 TABLET | Refills: 3 | Status: SHIPPED | OUTPATIENT
Start: 2021-07-22

## 2021-07-22 RX ORDER — LEVOTHYROXINE SODIUM 175 UG/1
175 TABLET ORAL DAILY
COMMUNITY
Start: 2021-06-17 | End: 2021-07-22 | Stop reason: SDUPTHER

## 2022-02-07 ENCOUNTER — TELEPHONE (OUTPATIENT)
Dept: SLEEP MEDICINE | Facility: CLINIC | Age: 37
End: 2022-02-07
Payer: MEDICARE

## 2022-02-08 ENCOUNTER — OFFICE VISIT (OUTPATIENT)
Dept: SLEEP MEDICINE | Facility: CLINIC | Age: 37
End: 2022-02-08
Payer: MEDICARE

## 2022-02-08 VITALS — HEART RATE: 82 BPM | DIASTOLIC BLOOD PRESSURE: 75 MMHG | SYSTOLIC BLOOD PRESSURE: 108 MMHG

## 2022-02-08 DIAGNOSIS — R35.1 NOCTURIA: ICD-10-CM

## 2022-02-08 DIAGNOSIS — G47.33 OSA (OBSTRUCTIVE SLEEP APNEA): Primary | ICD-10-CM

## 2022-02-08 DIAGNOSIS — G47.10 HYPERSOMNOLENCE: ICD-10-CM

## 2022-02-08 PROCEDURE — 99999 PR PBB SHADOW E&M-EST. PATIENT-LVL III: CPT | Mod: PBBFAC,,, | Performed by: INTERNAL MEDICINE

## 2022-02-08 PROCEDURE — 99999 PR PBB SHADOW E&M-EST. PATIENT-LVL III: ICD-10-PCS | Mod: PBBFAC,,, | Performed by: INTERNAL MEDICINE

## 2022-02-08 PROCEDURE — 99213 OFFICE O/P EST LOW 20 MIN: CPT | Mod: PBBFAC | Performed by: INTERNAL MEDICINE

## 2022-02-08 PROCEDURE — 99204 OFFICE O/P NEW MOD 45 MIN: CPT | Mod: S$PBB,,, | Performed by: INTERNAL MEDICINE

## 2022-02-08 PROCEDURE — 99204 PR OFFICE/OUTPT VISIT, NEW, LEVL IV, 45-59 MIN: ICD-10-PCS | Mod: S$PBB,,, | Performed by: INTERNAL MEDICINE

## 2022-02-08 NOTE — PROGRESS NOTES
ESTABLISHED SLEEP PATIENT VISIT (LOV : 4.15.2020)    Vikram Pollock  is a pleasant 37 y.o. male  Vikram Pollock is an 35 y.o. male with mild developmental delay HTN, DM, GERD, chronic kidney disease,and CALLI (dx PSG,RDI = 40.5,). Titrated to CPAP =16. Received new machine early 2020 set to auto 16-20.       PAP history   Problems    Mask FFM   Pressure tolerating   Benefit Sleeps better   DME [facility]   Machine age Late 2017   Download 2.7.22: 30/30 x 10h 12 min, 17-20 (17.0/17.6/18.1). leak 8/17/106, AHI 0.9         SLEEP SCHEDULE   Environment TV/ computer (sings)   Bed Time 11-12P   Sleep Latency Not long   Arousals rare   Nocturia once   Back to sleep Not long   Wake time 9AM   Naps Naps 2 hrs   Work        Vitals:    02/08/22 1043   BP: 108/75   BP Location: Left arm   Patient Position: Sitting   BP Method: Medium (Automatic)   Pulse: 82     Physical Exam:    GEN:   Well-appearing  Psych:  Appropriate affect, demonstrates insight  SKIN:  No rash on the face or bridge of the nose    LABS:   No results found for: HGB, CO2    RECORDS REVIEWED PREVIOUSLY:    12/29/2013 PSG (Dr. Lee) RDI = 40.5  o2 < 90% for 39.5 minutes  CPAP titration 5-10 (10 recommended)   9/23/2017 CPAP ([facility]) Titrated from 8-19 CWP  First significant improvement in sleep quality at CPAP equal to 12 which was largely effective in non-REM and stage REM sleep in the supine position  CPAP equal to 17 clearly effective in supine non-REM sleep.  CPAP equal to 16 recommended with his current machine. Pressures low as 12 likely be of significant benefit     02.12.20 Titration PSG 1. The patient was titrated on CPAP from 16 to 18cwp  2.Consolidated sleep was first seen at 16 cwp.  3.CPAP = 17cwp appeared effective at controlling obstructive events and hypoxemia in supine REM and NREM sleep.   Recommendations:  1.Auto-CPAP with settings CPAP min= 17cwp and CPAP max = 20 cwp       DATE Days   used Average use Settings Pressures Leaks AHI Mask  COMMENTS   8/31/2017 30/30 7h 44m n/a n/a n/a n/a   01.30.19  30/30 7hr 31m CPAP @ 16 EPR off   2/1/2020 29/30 7 hr 33 m Auto 16-20 16.0/16.6/17.0 20.9 1.0 FFM ramp off, EPR off,CCL  [facility]   04.13.20 50/50 8hr 17min Auto 17-20 17.0/17.6/18.1 17.9 1.1 (0.3) FFM     ASSESSMENT    PROBLEM DESCRIPTION/ Sx on Presentation  STATUS   CALLI   RDI 40.5.  retitrated to 16-20 Good usage and efficacy controlled   Daytime Sx   denies sleepiness when inactive   denies sleepiness when driving (does not drive)  Improved on PAP therapy Occasional naps but only if inactive  ESS n/a/24 on intake Less sleepy   controlled   Sleep-related hypoxemia   SpO2 < 90% for 39.5 minutes controlled Continue PAP  Likely controlled New   Delayed sleep phase   Was delayed on presentation Unclear what time he is going to sleep  Up at 8 AM, not hard to wake him up improved Continue strict wake time  Stable,  BT 11P-9AM New   Other issues: Hypertension  Chronic kidney disease  Diabetes  GERD  Mild developmental delay  Asthma: On symbicort/ albuterol  Cardiology: Dr. Ramya Pavon: Has appt to see Dr. Overton      PLAN       -continue auto-CPAP 17-20  -the patient is using and benefiting from PAP therapy      RTC          The patient was given open opportunity to ask questions and/or express concerns about treatment plan.   All questions/concerns were discussed.     Two patient identifiers used prior to evaluation.

## 2023-01-23 ENCOUNTER — TELEPHONE (OUTPATIENT)
Dept: ENDOCRINOLOGY | Facility: CLINIC | Age: 38
End: 2023-01-23
Payer: MEDICARE

## 2023-07-25 ENCOUNTER — OFFICE VISIT (OUTPATIENT)
Dept: SLEEP MEDICINE | Facility: CLINIC | Age: 38
End: 2023-07-25
Payer: MEDICARE

## 2023-07-25 VITALS
SYSTOLIC BLOOD PRESSURE: 117 MMHG | HEART RATE: 101 BPM | DIASTOLIC BLOOD PRESSURE: 73 MMHG | HEIGHT: 63 IN | BODY MASS INDEX: 52.45 KG/M2 | WEIGHT: 296 LBS

## 2023-07-25 DIAGNOSIS — G47.33 OSA (OBSTRUCTIVE SLEEP APNEA): Primary | ICD-10-CM

## 2023-07-25 DIAGNOSIS — J45.909 ASTHMA, UNSPECIFIED ASTHMA SEVERITY, UNSPECIFIED WHETHER COMPLICATED, UNSPECIFIED WHETHER PERSISTENT: ICD-10-CM

## 2023-07-25 PROCEDURE — 99214 OFFICE O/P EST MOD 30 MIN: CPT | Mod: S$GLB,,, | Performed by: INTERNAL MEDICINE

## 2023-07-25 PROCEDURE — 3008F PR BODY MASS INDEX (BMI) DOCUMENTED: ICD-10-PCS | Mod: CPTII,S$GLB,, | Performed by: INTERNAL MEDICINE

## 2023-07-25 PROCEDURE — 99999 PR PBB SHADOW E&M-EST. PATIENT-LVL III: ICD-10-PCS | Mod: PBBFAC,,, | Performed by: INTERNAL MEDICINE

## 2023-07-25 PROCEDURE — 1159F PR MEDICATION LIST DOCUMENTED IN MEDICAL RECORD: ICD-10-PCS | Mod: CPTII,S$GLB,, | Performed by: INTERNAL MEDICINE

## 2023-07-25 PROCEDURE — 99999 PR PBB SHADOW E&M-EST. PATIENT-LVL III: CPT | Mod: PBBFAC,,, | Performed by: INTERNAL MEDICINE

## 2023-07-25 PROCEDURE — 3074F PR MOST RECENT SYSTOLIC BLOOD PRESSURE < 130 MM HG: ICD-10-PCS | Mod: CPTII,S$GLB,, | Performed by: INTERNAL MEDICINE

## 2023-07-25 PROCEDURE — 99214 PR OFFICE/OUTPT VISIT, EST, LEVL IV, 30-39 MIN: ICD-10-PCS | Mod: S$GLB,,, | Performed by: INTERNAL MEDICINE

## 2023-07-25 PROCEDURE — 3074F SYST BP LT 130 MM HG: CPT | Mod: CPTII,S$GLB,, | Performed by: INTERNAL MEDICINE

## 2023-07-25 PROCEDURE — 1159F MED LIST DOCD IN RCRD: CPT | Mod: CPTII,S$GLB,, | Performed by: INTERNAL MEDICINE

## 2023-07-25 PROCEDURE — 4010F PR ACE/ARB THEARPY RXD/TAKEN: ICD-10-PCS | Mod: CPTII,S$GLB,, | Performed by: INTERNAL MEDICINE

## 2023-07-25 PROCEDURE — 3078F DIAST BP <80 MM HG: CPT | Mod: CPTII,S$GLB,, | Performed by: INTERNAL MEDICINE

## 2023-07-25 PROCEDURE — 3078F PR MOST RECENT DIASTOLIC BLOOD PRESSURE < 80 MM HG: ICD-10-PCS | Mod: CPTII,S$GLB,, | Performed by: INTERNAL MEDICINE

## 2023-07-25 PROCEDURE — 3008F BODY MASS INDEX DOCD: CPT | Mod: CPTII,S$GLB,, | Performed by: INTERNAL MEDICINE

## 2023-07-25 PROCEDURE — 4010F ACE/ARB THERAPY RXD/TAKEN: CPT | Mod: CPTII,S$GLB,, | Performed by: INTERNAL MEDICINE

## 2023-07-25 RX ORDER — METFORMIN HYDROCHLORIDE 750 MG/1
750 TABLET, EXTENDED RELEASE ORAL
COMMUNITY

## 2023-07-25 NOTE — PROGRESS NOTES
"ESTABLISHED SLEEP PATIENT VISIT     Vikram Pollock  is a pleasant 38 y.o. male  Vikram Pollock is an 35 y.o. male with mild developmental delay HTN, DM, GERD, chronic kidney disease,and CALLI (dx PSG,RDI = 40.5,). Titrated to CPAP =16. Received new machine early 2020 set to auto 16-20.     Here today for follow-up of CPAP    PLAN last visit:   -continue auto-CPAP 17-20    Since last visit:   He has been wearing CPAP regularly        PAP history   Problems    Mask FFM   Pressure tolerating   Benefit Sleeps better   DME [facility] (wants to switch to [employer])   Machine age Late 2017   Download 7.23.23: 30/30 x 6h 8 min, 17-20 (17/17.5/17.8), Leak 6/21/87, AHI 1.1         SLEEP SCHEDULE   Environment TV/ computer (sings)   Bed Time 10:30P   Sleep Latency Not long   Arousals rare   Nocturia once   Back to sleep Not long   Wake time 9AM   Naps    Work        Vitals:    07/25/23 1105   BP: 117/73   BP Location: Right arm   Patient Position: Sitting   BP Method: Small (Automatic)   Pulse: 101   Weight: 134.3 kg (296 lb)   Height: 5' 3" (1.6 m)       Physical Exam:    GEN:   Well-appearing  Psych:  Appropriate affect, demonstrates insight  SKIN:  No rash on the face or bridge of the nose    LABS:   No results found for: HGB, CO2    RECORDS REVIEWED PREVIOUSLY:    12/29/2013 PSG (Dr. Lee) RDI = 40.5  o2 < 90% for 39.5 minutes  CPAP titration 5-10 (10 recommended)   9/23/2017 CPAP ([facility]) Titrated from 8-19 CWP  First significant improvement in sleep quality at CPAP equal to 12 which was largely effective in non-REM and stage REM sleep in the supine position  CPAP equal to 17 clearly effective in supine non-REM sleep.  CPAP equal to 16 recommended with his current machine. Pressures low as 12 likely be of significant benefit     02.12.20 Titration PSG 1. The patient was titrated on CPAP from 16 to 18cwp  2.Consolidated sleep was first seen at 16 cwp.  3.CPAP = 17cwp appeared effective at controlling obstructive events " and hypoxemia in supine REM and NREM sleep.   Recommendations:  1.Auto-CPAP with settings CPAP min= 17cwp and CPAP max = 20 cwp       Downloads:  8/31/2017 30/30 7h 44m n/a n/a n/a n/a   01.30.19 30/30 7hr 31m CPAP @ 16 EPR off   2/1/2020 29/30 7 hr 33 m Auto 16-20 16.0/16.6/17.0 20.9 1.0 FFM ramp off, EPR off,CCL  04.13.20 50/50 8hr 17min Auto 17-20 17.0/17.6/18.1 17.9 1.1 (0.3) FFM   2.7.22: 30/30 x 10h 12 min, 17-20 (17.0/17.6/18.1). leak 8/17/106, AHI 0.9    ASSESSMENT    PROBLEM DESCRIPTION/ Sx on Presentation  STATUS   CALLI   RDI 40.5.  retitrated to 16-20  Has done well on CPAP Good usage and efficacy controlled   Daytime Sx   denies sleepiness when inactive   denies sleepiness when driving (does not drive)  Improved on PAP therapy Occasional naps but only if inactive  ESS n/a/24 on intake  ESS today:  Less sleepy   N/a   Sleep-related hypoxemia   SpO2 < 90% for 39.5 minutes controlled Continue PAP  Likely controlled N/a   Delayed sleep phase   Was delayed on presentation Unclear what time he is going to sleep  BT 10:30P, WT 9AM  Up at 8 AM, not hard to wake him up improved Continue strict wake time  Has regular BT 10:30P  WT 9AM on off days stable   Other issues:   Hypertension  Chronic kidney disease  Diabetes  GERD  Mild developmental delay  Asthma: symbicort/ albuterol (Dr. Overton)  Cardiology: Dr Bui        PLAN       -continue auto 17-20  -orders for supplies  -needs pulm to follow for asthma, will send referral to Yara Abbasi MD in [facility]  -the patient is using and benefiting from PAP therapy      RTC yearly         The patient was given open opportunity to ask questions and/or express concerns about treatment plan.   All questions/concerns were discussed.     Two patient identifiers used prior to evaluation.